# Patient Record
Sex: MALE | Race: WHITE | NOT HISPANIC OR LATINO | Employment: OTHER | URBAN - METROPOLITAN AREA
[De-identification: names, ages, dates, MRNs, and addresses within clinical notes are randomized per-mention and may not be internally consistent; named-entity substitution may affect disease eponyms.]

---

## 2019-07-15 PROBLEM — G43.919 INTRACTABLE MIGRAINE: Status: ACTIVE | Noted: 2017-09-22

## 2019-07-16 ENCOUNTER — OFFICE VISIT (OUTPATIENT)
Dept: INTERNAL MEDICINE | Facility: CLINIC | Age: 44
End: 2019-07-16
Payer: COMMERCIAL

## 2019-07-16 VITALS
DIASTOLIC BLOOD PRESSURE: 76 MMHG | TEMPERATURE: 98 F | WEIGHT: 240 LBS | SYSTOLIC BLOOD PRESSURE: 114 MMHG | RESPIRATION RATE: 14 BRPM | HEIGHT: 70 IN | HEART RATE: 78 BPM | OXYGEN SATURATION: 98 % | BODY MASS INDEX: 34.36 KG/M2

## 2019-07-16 DIAGNOSIS — Z00.00 ENCOUNTER FOR GENERAL ADULT MEDICAL EXAMINATION WITHOUT ABNORMAL FINDINGS: Primary | ICD-10-CM

## 2019-07-16 PROBLEM — G43.919 INTRACTABLE MIGRAINE: Status: RESOLVED | Noted: 2017-09-22 | Resolved: 2019-07-16

## 2019-07-16 PROCEDURE — 99386 PREV VISIT NEW AGE 40-64: CPT | Performed by: NURSE PRACTITIONER

## 2019-07-16 ASSESSMENT — ENCOUNTER SYMPTOMS
SORE THROAT: 0
ACTIVITY CHANGE: 0
NAUSEA: 0
ABDOMINAL PAIN: 0
PALPITATIONS: 0
LIGHT-HEADEDNESS: 0
FREQUENCY: 0
VOMITING: 0
HEADACHES: 0
COLOR CHANGE: 0
FEVER: 0
ADENOPATHY: 0
COUGH: 0
SHORTNESS OF BREATH: 0

## 2019-07-16 NOTE — ASSESSMENT & PLAN NOTE
Anticipatory guidance was given and reviewed.  Immunizations were addressed and reviewed today as well.  Advised to continue with healthy diet and increased exercise.  Next PE due in one year.

## 2019-07-16 NOTE — PROGRESS NOTES
Daily Progress Note      Subjective      Patient ID: Yoel Mendiola is a 43 y.o. male presents   Chief Complaint   Patient presents with   • Annual Exam   Last seen in 2014. Family known to me.     HPI    The following have been reviewed and updated as appropriate in this visit:  Tobacco  Allergies  Meds  Problems  Med Hx  Surg Hx  Fam Hx  Soc Hx        Review of Systems   Constitutional: Negative for activity change and fever.   HENT: Negative for congestion and sore throat.    Eyes: Negative for visual disturbance.   Respiratory: Negative for cough and shortness of breath.    Cardiovascular: Negative for chest pain and palpitations.   Gastrointestinal: Negative for abdominal pain, nausea and vomiting.   Endocrine: Negative for cold intolerance and heat intolerance.   Genitourinary: Negative for frequency.   Skin: Negative for color change and rash.   Neurological: Negative for light-headedness and headaches.   Hematological: Negative for adenopathy.           History reviewed. No pertinent past medical history.  Past Surgical History:   Procedure Laterality Date   • CYST REMOVAL     • WISDOM TOOTH EXTRACTION       Social History     Social History   • Marital status:      Spouse name: N/A   • Number of children: 2   • Years of education: N/A     Occupational History   • Not on file.     Social History Main Topics   • Smoking status: Former Smoker   • Smokeless tobacco: Never Used      Comment: quit 10 years ago   • Alcohol use No   • Drug use: Unknown   • Sexual activity: Not on file     Other Topics Concern   • Not on file     Social History Narrative    Self - employed -       Allergies   Allergen Reactions   • Penicillins Rash     No current outpatient prescriptions on file.     No current facility-administered medications for this visit.          Objective     /76 (BP Location: Left upper arm, Patient Position: Sitting)   Pulse 78   Temp 36.7 °C (98 °F) (Oral)   Resp 14   " Ht 1.778 m (5' 10\")   Wt 109 kg (240 lb)   SpO2 98%   BMI 34.44 kg/m²       Physical Exam   Constitutional: He is oriented to person, place, and time. He appears well-developed and well-nourished. He is cooperative. No distress.   HENT:   Head: Normocephalic.   Right Ear: Tympanic membrane is not erythematous.   Left Ear: Tympanic membrane is not erythematous.   Mouth/Throat: Mucous membranes are normal. No oropharyngeal exudate.   Eyes: Pupils are equal, round, and reactive to light. Conjunctivae are normal.   Neck: Normal range of motion. No thyromegaly present.   Cardiovascular: Normal rate, regular rhythm, normal heart sounds and intact distal pulses.    No murmur heard.  Pulmonary/Chest: Effort normal and breath sounds normal. No accessory muscle usage. No respiratory distress. He has no rales. He exhibits no tenderness.   Abdominal: Soft. Bowel sounds are normal. There is no hepatosplenomegaly. There is no tenderness.   Musculoskeletal: Normal range of motion.   Lymphadenopathy:     He has no cervical adenopathy.   Neurological: He is alert and oriented to person, place, and time. He displays normal reflexes. No cranial nerve deficit.   Skin: Skin is warm and dry. Capillary refill takes less than 2 seconds.   Psychiatric: He has a normal mood and affect.   Nursing note and vitals reviewed.      Assessment/Plan   Problem List Items Addressed This Visit        Other    Encounter for general adult medical examination without abnormal findings - Primary     Anticipatory guidance was given and reviewed.  Immunizations were addressed and reviewed today as well.  Advised to continue with healthy diet and increased exercise.  Next PE due in one year.         Relevant Orders    Comprehensive metabolic panel    CBC    Lipid panel    TSH 3rd Generation    Urinalysis with Reflex Culture              RUDOLPH Somers  7/16/2019  "

## 2019-07-26 LAB
ALBUMIN SERPL-MCNC: 4.1 G/DL (ref 3.6–5.1)
ALBUMIN/GLOB SERPL: 1.7 (CALC) (ref 1–2.5)
ALP SERPL-CCNC: 81 U/L (ref 40–115)
ALT SERPL-CCNC: 36 U/L (ref 9–46)
APPEARANCE UR: CLEAR
AST SERPL-CCNC: 28 U/L (ref 10–40)
BACTERIA #/AREA URNS HPF: NORMAL /HPF
BACTERIA UR CULT: NORMAL
BILIRUB SERPL-MCNC: 0.5 MG/DL (ref 0.2–1.2)
BILIRUB UR QL STRIP: NEGATIVE
BUN SERPL-MCNC: 16 MG/DL (ref 7–25)
BUN/CREAT SERPL: NORMAL (CALC) (ref 6–22)
CALCIUM SERPL-MCNC: 9 MG/DL (ref 8.6–10.3)
CHLORIDE SERPL-SCNC: 106 MMOL/L (ref 98–110)
CHOLEST SERPL-MCNC: 194 MG/DL
CHOLEST/HDLC SERPL: 4.5 (CALC)
CO2 SERPL-SCNC: 27 MMOL/L (ref 20–32)
COLOR UR: YELLOW
CREAT SERPL-MCNC: 0.98 MG/DL (ref 0.6–1.35)
ERYTHROCYTE [DISTWIDTH] IN BLOOD BY AUTOMATED COUNT: 12.9 % (ref 11–15)
GLOBULIN SER CALC-MCNC: 2.4 G/DL (CALC) (ref 1.9–3.7)
GLUCOSE SERPL-MCNC: 97 MG/DL (ref 65–99)
GLUCOSE UR QL STRIP: NEGATIVE
HCT VFR BLD AUTO: 42.4 % (ref 38.5–50)
HDLC SERPL-MCNC: 43 MG/DL
HGB BLD-MCNC: 14.2 G/DL (ref 13.2–17.1)
HGB UR QL STRIP: NEGATIVE
HYALINE CASTS #/AREA URNS LPF: NORMAL /LPF
KETONES UR QL STRIP: NEGATIVE
LDLC SERPL CALC-MCNC: 127 MG/DL (CALC)
LEUKOCYTE ESTERASE UR QL STRIP: NEGATIVE
MCH RBC QN AUTO: 30.3 PG (ref 27–33)
MCHC RBC AUTO-ENTMCNC: 33.5 G/DL (ref 32–36)
MCV RBC AUTO: 90.6 FL (ref 80–100)
NITRITE UR QL STRIP: NEGATIVE
NONHDLC SERPL-MCNC: 151 MG/DL (CALC)
PH UR STRIP: 5.5 [PH] (ref 5–8)
PLATELET # BLD AUTO: 252 THOUSAND/UL (ref 140–400)
PMV BLD REES-ECKER: 10.1 FL (ref 7.5–12.5)
POTASSIUM SERPL-SCNC: 4.4 MMOL/L (ref 3.5–5.3)
PROT SERPL-MCNC: 6.5 G/DL (ref 6.1–8.1)
PROT UR QL STRIP: NEGATIVE
QUEST EGFR NON-AFR. AMERICAN: 93 ML/MIN/1.73M2
RBC # BLD AUTO: 4.68 MILLION/UL (ref 4.2–5.8)
RBC #/AREA URNS HPF: NORMAL /HPF
SODIUM SERPL-SCNC: 137 MMOL/L (ref 135–146)
SP GR UR STRIP: 1.02 (ref 1–1.03)
SQUAMOUS #/AREA URNS HPF: NORMAL /HPF
TRIGL SERPL-MCNC: 127 MG/DL
TSH SERPL-ACNC: 1.35 MIU/L (ref 0.4–4.5)
WBC # BLD AUTO: 5.8 THOUSAND/UL (ref 3.8–10.8)
WBC #/AREA URNS HPF: NORMAL /HPF

## 2019-07-26 NOTE — PROGRESS NOTES
Please tell Yoel that his labs are stable! The one cholesterol level is minimally high, so recommend he keep the fats to less than 30 grams and keep up the good work with increased activity. Next labs due in one year.

## 2020-02-10 ENCOUNTER — TELEPHONE (OUTPATIENT)
Dept: INTERNAL MEDICINE | Facility: CLINIC | Age: 45
End: 2020-02-10

## 2020-02-10 ENCOUNTER — TRANSCRIBE ORDERS (OUTPATIENT)
Dept: SCHEDULING | Age: 45
End: 2020-02-10

## 2020-02-10 DIAGNOSIS — M25.512 PAIN IN LEFT SHOULDER: ICD-10-CM

## 2020-02-10 DIAGNOSIS — M54.10 RADICULOPATHY, SITE UNSPECIFIED: Primary | ICD-10-CM

## 2020-02-10 NOTE — TELEPHONE ENCOUNTER
Yoel is following [facility] and has already had an appmt with her there.

## 2020-02-14 ENCOUNTER — HOSPITAL ENCOUNTER (OUTPATIENT)
Dept: RADIOLOGY | Facility: HOSPITAL | Age: 45
Discharge: HOME | End: 2020-02-14
Attending: NURSE PRACTITIONER
Payer: COMMERCIAL

## 2020-02-14 DIAGNOSIS — M54.10 RADICULOPATHY, SITE UNSPECIFIED: ICD-10-CM

## 2020-02-14 DIAGNOSIS — M25.512 PAIN IN LEFT SHOULDER: ICD-10-CM

## 2020-02-14 PROCEDURE — 73030 X-RAY EXAM OF SHOULDER: CPT | Mod: LT

## 2020-03-13 ENCOUNTER — TRANSCRIBE ORDERS (OUTPATIENT)
Dept: SCHEDULING | Age: 45
End: 2020-03-13

## 2020-03-13 DIAGNOSIS — M75.52 BURSITIS OF LEFT SHOULDER: Primary | ICD-10-CM

## 2020-03-16 ENCOUNTER — HOSPITAL ENCOUNTER (OUTPATIENT)
Dept: RADIOLOGY | Facility: HOSPITAL | Age: 45
Discharge: HOME | End: 2020-03-16
Attending: ORTHOPAEDIC SURGERY
Payer: COMMERCIAL

## 2020-03-16 DIAGNOSIS — M75.52 BURSITIS OF LEFT SHOULDER: ICD-10-CM

## 2020-08-31 ENCOUNTER — OFFICE VISIT (OUTPATIENT)
Dept: FAMILY MEDICINE | Facility: CLINIC | Age: 45
End: 2020-08-31
Payer: COMMERCIAL

## 2020-08-31 VITALS
RESPIRATION RATE: 18 BRPM | SYSTOLIC BLOOD PRESSURE: 152 MMHG | WEIGHT: 252 LBS | DIASTOLIC BLOOD PRESSURE: 88 MMHG | OXYGEN SATURATION: 98 % | HEIGHT: 71 IN | HEART RATE: 62 BPM | TEMPERATURE: 98 F | BODY MASS INDEX: 35.28 KG/M2

## 2020-08-31 DIAGNOSIS — R03.0 ELEVATED BLOOD PRESSURE READING WITHOUT DIAGNOSIS OF HYPERTENSION: ICD-10-CM

## 2020-08-31 DIAGNOSIS — Z00.00 ANNUAL PHYSICAL EXAM: Primary | ICD-10-CM

## 2020-08-31 DIAGNOSIS — Z12.11 ENCOUNTER FOR SCREENING COLONOSCOPY: ICD-10-CM

## 2020-08-31 PROCEDURE — 99396 PREV VISIT EST AGE 40-64: CPT | Performed by: FAMILY MEDICINE

## 2020-09-05 ENCOUNTER — PREP FOR CASE (OUTPATIENT)
Dept: SURGERY | Facility: CLINIC | Age: 45
End: 2020-09-05

## 2020-09-05 DIAGNOSIS — Z12.11 ENCOUNTER FOR SCREENING FOR MALIGNANT NEOPLASM OF COLON: Primary | ICD-10-CM

## 2020-09-05 LAB
ALBUMIN SERPL-MCNC: 4.2 G/DL (ref 3.6–5.1)
ALBUMIN/GLOB SERPL: 1.8 (CALC) (ref 1–2.5)
ALP SERPL-CCNC: 79 U/L (ref 36–130)
ALT SERPL-CCNC: 65 U/L (ref 9–46)
APPEARANCE UR: CLEAR
AST SERPL-CCNC: 35 U/L (ref 10–40)
BACTERIA #/AREA URNS HPF: NORMAL /HPF
BACTERIA UR CULT: NORMAL
BASOPHILS # BLD AUTO: 40 CELLS/UL (ref 0–200)
BASOPHILS NFR BLD AUTO: 0.7 %
BILIRUB SERPL-MCNC: 0.5 MG/DL (ref 0.2–1.2)
BILIRUB UR QL STRIP: NEGATIVE
BUN SERPL-MCNC: 12 MG/DL (ref 7–25)
BUN/CREAT SERPL: ABNORMAL (CALC) (ref 6–22)
CALCIUM SERPL-MCNC: 9.2 MG/DL (ref 8.6–10.3)
CHLORIDE SERPL-SCNC: 106 MMOL/L (ref 98–110)
CHOLEST SERPL-MCNC: 193 MG/DL
CHOLEST/HDLC SERPL: 4.6 (CALC)
CO2 SERPL-SCNC: 26 MMOL/L (ref 20–32)
COLOR UR: YELLOW
CREAT SERPL-MCNC: 0.93 MG/DL (ref 0.6–1.35)
EOSINOPHIL # BLD AUTO: 234 CELLS/UL (ref 15–500)
EOSINOPHIL NFR BLD AUTO: 4.1 %
ERYTHROCYTE [DISTWIDTH] IN BLOOD BY AUTOMATED COUNT: 13.1 % (ref 11–15)
GLOBULIN SER CALC-MCNC: 2.4 G/DL (CALC) (ref 1.9–3.7)
GLUCOSE SERPL-MCNC: 98 MG/DL (ref 65–99)
GLUCOSE UR QL STRIP: NEGATIVE
HCT VFR BLD AUTO: 41.9 % (ref 38.5–50)
HDLC SERPL-MCNC: 42 MG/DL
HGB BLD-MCNC: 14 G/DL (ref 13.2–17.1)
HGB UR QL STRIP: NEGATIVE
HYALINE CASTS #/AREA URNS LPF: NORMAL /LPF
KETONES UR QL STRIP: NEGATIVE
LDLC SERPL CALC-MCNC: 129 MG/DL (CALC)
LEUKOCYTE ESTERASE UR QL STRIP: NEGATIVE
LYMPHOCYTES # BLD AUTO: 1767 CELLS/UL (ref 850–3900)
LYMPHOCYTES NFR BLD AUTO: 31 %
MCH RBC QN AUTO: 30.2 PG (ref 27–33)
MCHC RBC AUTO-ENTMCNC: 33.4 G/DL (ref 32–36)
MCV RBC AUTO: 90.5 FL (ref 80–100)
MONOCYTES # BLD AUTO: 502 CELLS/UL (ref 200–950)
MONOCYTES NFR BLD AUTO: 8.8 %
NEUTROPHILS # BLD AUTO: 3158 CELLS/UL (ref 1500–7800)
NEUTROPHILS NFR BLD AUTO: 55.4 %
NITRITE UR QL STRIP: NEGATIVE
NONHDLC SERPL-MCNC: 151 MG/DL (CALC)
PH UR STRIP: 6 [PH] (ref 5–8)
PLATELET # BLD AUTO: 221 THOUSAND/UL (ref 140–400)
PMV BLD REES-ECKER: 10.3 FL (ref 7.5–12.5)
POTASSIUM SERPL-SCNC: 4.5 MMOL/L (ref 3.5–5.3)
PROT SERPL-MCNC: 6.6 G/DL (ref 6.1–8.1)
PROT UR QL STRIP: NEGATIVE
QUEST EGFR NON-AFR. AMERICAN: 99 ML/MIN/1.73M2
RBC # BLD AUTO: 4.63 MILLION/UL (ref 4.2–5.8)
RBC #/AREA URNS HPF: NORMAL /HPF
SODIUM SERPL-SCNC: 139 MMOL/L (ref 135–146)
SP GR UR STRIP: 1.02 (ref 1–1.03)
SQUAMOUS #/AREA URNS HPF: NORMAL /HPF
TRIGL SERPL-MCNC: 114 MG/DL
TSH SERPL-ACNC: 1.11 MIU/L (ref 0.4–4.5)
WBC # BLD AUTO: 5.7 THOUSAND/UL (ref 3.8–10.8)
WBC #/AREA URNS HPF: NORMAL /HPF

## 2020-09-05 RX ORDER — SODIUM CHLORIDE 9 MG/ML
INJECTION, SOLUTION INTRAVENOUS CONTINUOUS
Status: CANCELLED | OUTPATIENT
Start: 2020-09-05 | End: 2020-09-06

## 2020-09-05 NOTE — H&P
Chief Complaint: No chief complaint on file.  .    HPI     Patient is a 44 y.o. male who presents with the following:    - need colonoscopy  - 43 y/o  - no risk factors  - no sx  - never had one     Medical History:   No past medical history on file.    Surgical History:   Past Surgical History:   Procedure Laterality Date   • CYST REMOVAL     • WISDOM TOOTH EXTRACTION         Social History:   Social History     Socioeconomic History   • Marital status:      Spouse name: Not on file   • Number of children: 2   • Years of education: Not on file   • Highest education level: Not on file   Occupational History   • Not on file   Social Needs   • Financial resource strain: Not on file   • Food insecurity:     Worry: Not on file     Inability: Not on file   • Transportation needs:     Medical: Not on file     Non-medical: Not on file   Tobacco Use   • Smoking status: Former Smoker   • Smokeless tobacco: Never Used   • Tobacco comment: quit 10 years ago   Substance and Sexual Activity   • Alcohol use: No   • Drug use: Not on file   • Sexual activity: Not on file   Lifestyle   • Physical activity:     Days per week: Not on file     Minutes per session: Not on file   • Stress: Not on file   Relationships   • Social connections:     Talks on phone: Not on file     Gets together: Not on file     Attends Religion service: Not on file     Active member of club or organization: Not on file     Attends meetings of clubs or organizations: Not on file     Relationship status: Not on file   • Intimate partner violence:     Fear of current or ex partner: Not on file     Emotionally abused: Not on file     Physically abused: Not on file     Forced sexual activity: Not on file   Other Topics Concern   • Not on file   Social History Narrative    Self - employed -         Family History:   Family History   Problem Relation Age of Onset   • Hypertension Biological Mother    • Heart disease Biological Father     • Diabetes Biological Father    • Hyperlipidemia Biological Father    • Hypertension Biological Father    • No Known Problems Biological Sister    • Diabetes Maternal Grandmother    • Stroke Maternal Grandmother    • Colon cancer Maternal Grandfather    • Heart attack Paternal Grandmother    • Colon cancer Other        Allergies:   Penicillins    Current Medications:    No current outpatient medications on file.    Review of Systems  All 14 systems reviewed and the findings are not pertinent to the current problem.    Objective     Vital Signs  There were no vitals filed for this visit.  There is no height or weight on file to calculate BMI.      Physicial Exam  To be done    Problem List Items Addressed This Visit     None        Plan screen low risk scope    Devyn Rivas MD 9/5/2020 9:58 AM

## 2020-09-10 PROBLEM — Z12.11 ENCOUNTER FOR SCREENING FOR MALIGNANT NEOPLASM OF COLON: Status: ACTIVE | Noted: 2020-09-10

## 2020-09-14 ENCOUNTER — OFFICE VISIT (OUTPATIENT)
Dept: FAMILY MEDICINE | Facility: CLINIC | Age: 45
End: 2020-09-14
Payer: COMMERCIAL

## 2020-09-14 VITALS
WEIGHT: 257 LBS | RESPIRATION RATE: 16 BRPM | OXYGEN SATURATION: 98 % | DIASTOLIC BLOOD PRESSURE: 92 MMHG | BODY MASS INDEX: 35.98 KG/M2 | HEIGHT: 71 IN | TEMPERATURE: 98.3 F | HEART RATE: 63 BPM | SYSTOLIC BLOOD PRESSURE: 136 MMHG

## 2020-09-14 DIAGNOSIS — E78.5 DYSLIPIDEMIA, GOAL LDL BELOW 100: ICD-10-CM

## 2020-09-14 DIAGNOSIS — Z23 INFLUENZA VACCINATION ADMINISTERED AT CURRENT VISIT: ICD-10-CM

## 2020-09-14 DIAGNOSIS — R03.0 ELEVATED BLOOD PRESSURE READING IN OFFICE WITHOUT DIAGNOSIS OF HYPERTENSION: Primary | ICD-10-CM

## 2020-09-14 DIAGNOSIS — R89.9 ABNORMAL LABORATORY TEST RESULT: ICD-10-CM

## 2020-09-14 PROCEDURE — 90471 IMMUNIZATION ADMIN: CPT | Performed by: FAMILY MEDICINE

## 2020-09-14 PROCEDURE — 99213 OFFICE O/P EST LOW 20 MIN: CPT | Mod: 25 | Performed by: FAMILY MEDICINE

## 2020-09-14 PROCEDURE — 90686 IIV4 VACC NO PRSV 0.5 ML IM: CPT | Performed by: FAMILY MEDICINE

## 2020-09-14 ASSESSMENT — ENCOUNTER SYMPTOMS
FEVER: 0
LIGHT-HEADEDNESS: 0
SHORTNESS OF BREATH: 0
CHEST TIGHTNESS: 0
HEADACHES: 0
DIZZINESS: 0
APPETITE CHANGE: 0

## 2020-09-14 ASSESSMENT — PAIN SCALES - GENERAL: PAINLEVEL: 0-NO PAIN

## 2020-09-14 NOTE — PATIENT INSTRUCTIONS
Your blood pressure was elevated in the office today but not high enough for medications at this point.     Continue with diet and exercise management.     Goal blood pressure is under 140/90. Ideally, blood pressure is 120/80.    If you experience any headaches, dizziness, or changes in vision, please contact the office for a recheck.

## 2020-09-14 NOTE — PROGRESS NOTES
"Subjective      Patient ID: Yoel Mendiola is a 44 y.o. male presents to blood pressure recheck  1975      Patient's previous blood pressure 2 weeks ago was 152/88.  Pressure today is 136/92.  Since last visit, patient has been exercising more frequently.      The following have been reviewed and updated as appropriate in this visit:  Allergies  Meds  Problems       Review of Systems   Constitutional: Negative for appetite change and fever.   Eyes: Negative for visual disturbance.   Respiratory: Negative for chest tightness and shortness of breath.    Cardiovascular: Negative for chest pain.   Neurological: Negative for dizziness, syncope, light-headedness and headaches.   Psychiatric/Behavioral: Negative for behavioral problems.       Objective     Vitals:    09/14/20 1141 09/14/20 1149   BP: (!) 126/96 (!) 136/92   BP Location: Right upper arm Right upper arm   Patient Position: Sitting Sitting   Pulse: 63    Resp: 16    Temp: 36.8 °C (98.3 °F)    TempSrc: Temporal    SpO2: 98%    Weight: 117 kg (257 lb)    Height: 1.803 m (5' 11\")      Body mass index is 35.84 kg/m².    Physical Exam   Constitutional: He is oriented to person, place, and time. He appears well-developed and well-nourished.   HENT:   Head: Normocephalic and atraumatic.   Eyes: EOM are normal.   Neck: Normal range of motion. Neck supple.   Cardiovascular: Normal rate, regular rhythm and normal heart sounds. Exam reveals no friction rub.   No murmur heard.  Pulmonary/Chest: Effort normal and breath sounds normal. No respiratory distress. He has no wheezes. He has no rales.   Neurological: He is alert and oriented to person, place, and time.   Nursing note and vitals reviewed.      Assessment/Plan   Diagnoses and all orders for this visit:    Elevated blood pressure reading in office without diagnosis of hypertension (Primary)  Comments:  Discussed treatment options including medication and diet/exercise management.  Patient has elected for " conservative management with diet and exercise at this time.  Advised to check blood pressure once or twice a week to maintain goal of less than 140/90.  Follow-up in 3 months.    Dyslipidemia, goal LDL below 100  Comments:  ASCVD 3%.  Based on recommendation, no statin management at this time.  Continue diet and exercise management along with fish oil or red yeast rice supplement.    Abnormal laboratory test result  Discussed test results with patient in the office today.    Influenza vaccination administered at current visit  FluLaval was given in the office today. There were no adverse reaction to the vaccine given today. Counseling was done in the room.    -     Influenza vaccine quadrivalent preservative free 6 mon and older IM

## 2020-10-12 ENCOUNTER — APPOINTMENT (OUTPATIENT)
Dept: LAB | Facility: HOSPITAL | Age: 45
End: 2020-10-12
Attending: SURGERY
Payer: COMMERCIAL

## 2020-10-12 DIAGNOSIS — Z12.11 ENCOUNTER FOR SCREENING FOR MALIGNANT NEOPLASM OF COLON: ICD-10-CM

## 2020-10-12 PROCEDURE — U0003 INFECTIOUS AGENT DETECTION BY NUCLEIC ACID (DNA OR RNA); SEVERE ACUTE RESPIRATORY SYNDROME CORONAVIRUS 2 (SARS-COV-2) (CORONAVIRUS DISEASE [COVID-19]), AMPLIFIED PROBE TECHNIQUE, MAKING USE OF HIGH THROUGHPUT TECHNOLOGIES AS DESCRIBED BY CMS-2020-01-R: HCPCS

## 2020-10-13 LAB — SARS-COV-2 RNA RESP QL NAA+PROBE: NOT DETECTED

## 2020-10-15 ENCOUNTER — ANESTHESIA EVENT (OUTPATIENT)
Dept: ENDOSCOPY | Facility: HOSPITAL | Age: 45
End: 2020-10-15
Payer: COMMERCIAL

## 2020-10-15 ENCOUNTER — ANESTHESIA (OUTPATIENT)
Dept: ENDOSCOPY | Facility: HOSPITAL | Age: 45
End: 2020-10-15
Payer: COMMERCIAL

## 2020-10-15 ENCOUNTER — HOSPITAL ENCOUNTER (OUTPATIENT)
Facility: HOSPITAL | Age: 45
Discharge: HOME | End: 2020-10-15
Attending: SURGERY | Admitting: SURGERY
Payer: COMMERCIAL

## 2020-10-15 VITALS
OXYGEN SATURATION: 97 % | HEIGHT: 71 IN | HEART RATE: 54 BPM | DIASTOLIC BLOOD PRESSURE: 85 MMHG | WEIGHT: 250 LBS | BODY MASS INDEX: 35 KG/M2 | TEMPERATURE: 98.5 F | RESPIRATION RATE: 16 BRPM | SYSTOLIC BLOOD PRESSURE: 124 MMHG

## 2020-10-15 DIAGNOSIS — Z12.11 ENCOUNTER FOR SCREENING FOR MALIGNANT NEOPLASM OF COLON: Primary | ICD-10-CM

## 2020-10-15 PROCEDURE — 200200 PR NO CHARGE: Performed by: SURGERY

## 2020-10-15 PROCEDURE — 0DJD8ZZ INSPECTION OF LOWER INTESTINAL TRACT, VIA NATURAL OR ARTIFICIAL OPENING ENDOSCOPIC: ICD-10-PCS | Performed by: SURGERY

## 2020-10-15 PROCEDURE — 25800000 HC PHARMACY IV SOLUTIONS: Performed by: ANESTHESIOLOGY

## 2020-10-15 PROCEDURE — 63600000 HC DRUGS/DETAIL CODE: Mod: JW | Performed by: ANESTHESIOLOGY

## 2020-10-15 PROCEDURE — 37000001 HC ANESTHESIA GENERAL: Performed by: SURGERY

## 2020-10-15 PROCEDURE — 25000000 HC PHARMACY GENERAL: Performed by: ANESTHESIOLOGY

## 2020-10-15 PROCEDURE — G0121 COLON CA SCRN NOT HI RSK IND: HCPCS | Performed by: SURGERY

## 2020-10-15 PROCEDURE — 75000019 HC COLONSCOPY SCREEN NORISK PT: Performed by: SURGERY

## 2020-10-15 RX ORDER — PROPOFOL 200MG/20ML
SYRINGE (ML) INTRAVENOUS AS NEEDED
Status: DISCONTINUED | OUTPATIENT
Start: 2020-10-15 | End: 2020-10-15 | Stop reason: SURG

## 2020-10-15 RX ORDER — SODIUM CHLORIDE 9 MG/ML
INJECTION, SOLUTION INTRAVENOUS CONTINUOUS PRN
Status: DISCONTINUED | OUTPATIENT
Start: 2020-10-15 | End: 2020-10-15 | Stop reason: SURG

## 2020-10-15 RX ORDER — SODIUM CHLORIDE 9 MG/ML
INJECTION, SOLUTION INTRAVENOUS CONTINUOUS
Status: CANCELLED | OUTPATIENT
Start: 2020-10-15 | End: 2020-10-16

## 2020-10-15 RX ORDER — LIDOCAINE HYDROCHLORIDE 10 MG/ML
INJECTION, SOLUTION INFILTRATION; PERINEURAL AS NEEDED
Status: DISCONTINUED | OUTPATIENT
Start: 2020-10-15 | End: 2020-10-15 | Stop reason: SURG

## 2020-10-15 RX ADMIN — LIDOCAINE HYDROCHLORIDE 5 ML: 10 INJECTION, SOLUTION INFILTRATION; PERINEURAL at 09:53

## 2020-10-15 RX ADMIN — SODIUM CHLORIDE: 900 INJECTION, SOLUTION INTRAVENOUS at 09:47

## 2020-10-15 RX ADMIN — PROPOFOL 20 MG: 10 INJECTION, EMULSION INTRAVENOUS at 09:58

## 2020-10-15 RX ADMIN — PROPOFOL 30 MG: 10 INJECTION, EMULSION INTRAVENOUS at 09:57

## 2020-10-15 RX ADMIN — PROPOFOL 200 MG: 10 INJECTION, EMULSION INTRAVENOUS at 09:53

## 2020-10-15 RX ADMIN — PROPOFOL 20 MG: 10 INJECTION, EMULSION INTRAVENOUS at 10:06

## 2020-10-15 RX ADMIN — PROPOFOL 30 MG: 10 INJECTION, EMULSION INTRAVENOUS at 09:59

## 2020-10-15 RX ADMIN — PROPOFOL 50 MG: 10 INJECTION, EMULSION INTRAVENOUS at 10:04

## 2020-10-15 RX ADMIN — PROPOFOL 30 MG: 10 INJECTION, EMULSION INTRAVENOUS at 10:03

## 2020-10-15 RX ADMIN — PROPOFOL 20 MG: 10 INJECTION, EMULSION INTRAVENOUS at 10:01

## 2020-10-15 RX ADMIN — PROPOFOL 30 MG: 10 INJECTION, EMULSION INTRAVENOUS at 10:08

## 2020-10-15 NOTE — ANESTHESIA PREPROCEDURE EVALUATION
Relevant Problems   No relevant active problems       Anesthesia ROS/MED HX      GI/Hepatic   Bowel prep  Endo/Other  Body Habitus: Normal       Past Surgical History:   Procedure Laterality Date   • CYST REMOVAL     • WISDOM TOOTH EXTRACTION         Physical Exam    Airway   Mallampati: II   TM distance: >3 FB   Neck ROM: full  Cardiovascular - normal   Rhythm: regular   Rate: normalPulmonary - normal   clear to auscultation  Dental - normal        Anesthesia Plan    Plan: general    Technique: total IV anesthesia     Lines and Monitors: PIV     Airway: natural airway / supplemental oxygen   ASA 1  Blood Products:   Use of Blood Products Discussed: No   Anesthetic plan and risks discussed with: patient  Induction:    intravenous   Postop Plan:   Patient Disposition: phase II then home

## 2020-10-15 NOTE — ANESTHESIA POSTPROCEDURE EVALUATION
Patient: Yoel Mendiola    Procedure Summary     Date: 10/15/20 Room / Location: [facility] 2 / [facility]    Anesthesia Start: 0948 Anesthesia Stop: 1017    Procedure: FLEXIBLE COLONOSCOPY FOR COLORECTAL CANCER SCREENING NO RISK (N/A Anus) Diagnosis:       Encounter for screening for malignant neoplasm of colon      (Encounter for screening for malignant neoplasm of colon [Z12.11])    Provider: Devyn Rivas MD Responsible Provider: Abi Santillan MD    Anesthesia Type: general ASA Status: 1          Anesthesia Type: general  PACU Vitals     No data found in the last 10 encounters.            Anesthesia Post Evaluation    Pain management: adequate  Patient location during evaluation: PACU  Patient participation: complete - patient participated  Level of consciousness: awake and alert  Cardiovascular status: acceptable  Airway Patency: adequate  Respiratory status: acceptable  Hydration status: acceptable  Anesthetic complications: no

## 2020-10-15 NOTE — OP NOTE
_______________________________________________________________________________  Patient Name: Yoel Mendiola             Procedure Date: 10/15/2020 9:13 AM  MRN: [id number]                     Account Number: [id number]  YOB: 1975             Age: 45  Gender: Male                          Note Status: Finalized  Attending MD: DAINA HIGUERA MD~KALEN  _______________________________________________________________________________  Procedure:            Colonoscopy  Indications:          Screening for colorectal malignant neoplasm  Providers:            DAINA HIGUERA MD~KALEN (Doctor), Jose Mojica RN  (Nurse), Robyn Cuenca (Nurse)  Referring MD:         SANDY DOAN MD  Requesting Provider:  Medicines:            Monitored Anesthesia Care  Complications:        No immediate complications.  _______________________________________________________________________________  Procedure:            After I obtained informed consent, the scope was passed  under direct vision. Throughout the procedure, the  patient's blood pressure, pulse, and oxygen saturations  were monitored continuously. The colonoscope was  introduced through the anus and advanced to the cecum,  identified by appendiceal orifice and ileocecal valve.  The colonoscopy was performed without difficulty. The  patient tolerated the procedure well. The quality of  the bowel preparation was good.  Estimated Blood Loss: Estimated blood loss: none.  Findings:  The perianal and digital rectal examinations were normal.  The colon (entire examined portion) appeared normal.  Impression:           - The entire examined colon is normal.  - No specimens collected.  Recommendation:       - Repeat colonoscopy in 10 years for screening purposes.  Procedure Code(s):    --- Professional ---  , Colorectal cancer screening; colonoscopy on  individual not meeting criteria for high risk  Diagnosis Code(s):    --- Professional ---  Z12.11, Encounter  for screening for malignant neoplasm  of colon  CPT copyright 2018 [employer]. All rights reserved.  The codes documented in this report are preliminary and upon  review may  be revised to meet current compliance requirements.  ____________________________  HEMANT GEORGE  10/15/2020 10:26:25 AM  This report has been signed electronically.  Number of Addenda: 0  Note Initiated On: 10/15/2020 9:13 AM

## 2020-10-15 NOTE — H&P
Chief Complaint: No chief complaint on file.  .    HPI     Patient is a 45 y.o. male who presents with the following:    - need colonoscopy  - 43 y/o  - no risk factors  - no sx  - never had one     Medical History:   History reviewed. No pertinent past medical history.    Surgical History:   Past Surgical History:   Procedure Laterality Date   • CYST REMOVAL     • WISDOM TOOTH EXTRACTION         Social History:   Social History     Socioeconomic History   • Marital status:      Spouse name: Not on file   • Number of children: 2   • Years of education: Not on file   • Highest education level: Not on file   Occupational History   • Not on file   Social Needs   • Financial resource strain: Not on file   • Food insecurity     Worry: Not on file     Inability: Not on file   • Transportation needs     Medical: Not on file     Non-medical: Not on file   Tobacco Use   • Smoking status: Former Smoker   • Smokeless tobacco: Never Used   • Tobacco comment: quit 10 years ago   Substance and Sexual Activity   • Alcohol use: No   • Drug use: Never   • Sexual activity: Not on file   Lifestyle   • Physical activity     Days per week: Not on file     Minutes per session: Not on file   • Stress: Not on file   Relationships   • Social connections     Talks on phone: Not on file     Gets together: Not on file     Attends [employer] service: Not on file     Active member of club or organization: Not on file     Attends meetings of clubs or organizations: Not on file     Relationship status: Not on file   • Intimate partner violence     Fear of current or ex partner: Not on file     Emotionally abused: Not on file     Physically abused: Not on file     Forced sexual activity: Not on file   Other Topics Concern   • Not on file   Social History Narrative    Self - employed -         Family History:   Family History   Problem Relation Age of Onset   • Hypertension Biological Mother    • Heart disease Biological  Father    • Diabetes Biological Father    • Hyperlipidemia Biological Father    • Hypertension Biological Father    • No Known Problems Biological Sister    • Diabetes Maternal Grandmother    • Stroke Maternal Grandmother    • Colon cancer Maternal Grandfather    • Heart attack Paternal Grandmother    • Colon cancer Other        Allergies:   Penicillins    Current Medications:    No current facility-administered medications for this encounter.     Review of Systems  All 14 systems reviewed and the findings are not pertinent to the current problem.    Objective     Vital Signs  There were no vitals filed for this visit.  There is no height or weight on file to calculate BMI.      Physicial Exam  To be done    Problem List Items Addressed This Visit     None        Plan screen low risk scope    Devyn Rivas MD 10/15/2020 8:36 AM

## 2020-10-15 NOTE — INTERVAL H&P NOTE
H&P reviewed. The patient was examined and there are no changes to the H&P.     - Lungs clear  - Heart RRR  - Abdomen soft and NT

## 2020-10-15 NOTE — ANESTHESIOLOGIST PRE-PROCEDURE ATTESTATION
Pre-Procedure Patient Identification:  I am the Primary Anesthesiologist and have identified the patient on 10/15/20 at 9:48 AM.   I have confirmed the following procedure(s) FLEXIBLE COLONOSCOPY FOR COLORECTAL CANCER SCREENING NO RISK will be performed by the following surgeon/proceduralist Devyn Rivas MD.

## 2020-12-14 ENCOUNTER — OFFICE VISIT (OUTPATIENT)
Dept: FAMILY MEDICINE | Facility: CLINIC | Age: 45
End: 2020-12-14
Payer: COMMERCIAL

## 2020-12-14 VITALS
SYSTOLIC BLOOD PRESSURE: 140 MMHG | OXYGEN SATURATION: 98 % | BODY MASS INDEX: 36.54 KG/M2 | DIASTOLIC BLOOD PRESSURE: 98 MMHG | RESPIRATION RATE: 16 BRPM | HEART RATE: 62 BPM | HEIGHT: 71 IN | WEIGHT: 261 LBS | TEMPERATURE: 98.2 F

## 2020-12-14 DIAGNOSIS — R03.0 ELEVATED BLOOD PRESSURE READING IN OFFICE WITH WHITE COAT SYNDROME, WITHOUT DIAGNOSIS OF HYPERTENSION: Primary | ICD-10-CM

## 2020-12-14 PROCEDURE — 99213 OFFICE O/P EST LOW 20 MIN: CPT | Performed by: FAMILY MEDICINE

## 2020-12-14 ASSESSMENT — ENCOUNTER SYMPTOMS
VOMITING: 0
NAUSEA: 0
DIZZINESS: 0
CHEST TIGHTNESS: 0
SHORTNESS OF BREATH: 0
SORE THROAT: 0
MYALGIAS: 1
HEADACHES: 0
FEVER: 0
LIGHT-HEADEDNESS: 0
DIARRHEA: 0

## 2020-12-14 ASSESSMENT — PAIN SCALES - GENERAL: PAINLEVEL: 0-NO PAIN

## 2020-12-14 ASSESSMENT — PATIENT HEALTH QUESTIONNAIRE - PHQ9: SUM OF ALL RESPONSES TO PHQ9 QUESTIONS 1 & 2: 0

## 2020-12-14 NOTE — PATIENT INSTRUCTIONS
Patient Education     Preventing Hypertension  Hypertension, commonly called high blood pressure, is when the force of blood pumping through the arteries is too strong. Arteries are blood vessels that carry blood from the heart throughout the body. Over time, hypertension can damage the arteries and decrease blood flow to important parts of the body, including the brain, heart, and kidneys. Often, hypertension does not cause symptoms until blood pressure is very high. For this reason, it is important to have your blood pressure checked on a regular basis.  Hypertension can often be prevented with diet and lifestyle changes. If you already have hypertension, you can control it with diet and lifestyle changes, as well as medicine.  What nutrition changes can be made?  Maintain a healthy diet. This includes:  · Eating less salt (sodium). Ask your health care provider how much sodium is safe for you to have. The general recommendation is to consume less than 1 tsp (2,300 mg) of sodium a day.  ? Do not add salt to your food.  ? Choose low-sodium options when grocery shopping and eating out.  · Limiting fats in your diet. You can do this by eating low-fat or fat-free dairy products and by eating less red meat.  · Eating more fruits, vegetables, and whole grains. Make a goal to eat:  ? 1½-2 cups of fresh fruits and vegetables each day.  ? 3-4 servings of whole grains each day.  · Avoiding foods and beverages that have added sugars.  · Eating fish that contain healthy fats (omega-3 fatty acids), such as mackerel or salmon.  If you need help putting together a healthy eating plan, try the DASH diet. This diet is high in fruits, vegetables, and whole grains. It is low in sodium, red meat, and added sugars. DASH stands for Dietary Approaches to Stop Hypertension.  What lifestyle changes can be made?    · Lose weight if you are overweight. Losing just 3?5% of your body weight can help prevent or control hypertension.  ? For  example, if your present weight is 200 lb (91 kg), a loss of 3-5% of your weight means losing 6-10 lb (2.7-4.5 kg).  ? Ask your health care provider to help you with a diet and exercise plan to safely lose weight.  · Get enough exercise. Do at least 150 minutes of moderate-intensity exercise each week.  ? You could do this in short exercise sessions several times a day, or you could do longer exercise sessions a few times a week. For example, you could take a brisk 10-minute walk or bike ride, 3 times a day, for 5 days a week.  · Find ways to reduce stress, such as exercising, meditating, listening to music, or taking a yoga class. If you need help reducing stress, ask your health care provider.  · Do not smoke. This includes e-cigarettes. Chemicals in tobacco and nicotine products raise your blood pressure each time you smoke. If you need help quitting, ask your health care provider.  · Avoid alcohol. If you drink alcohol, limit alcohol intake to no more than 1 drink a day for nonpregnant women and 2 drinks a day for men. One drink equals 12 oz of beer, 5 oz of wine, or 1½ oz of hard liquor.  Why are these changes important?  Diet and lifestyle changes can help you prevent hypertension, and they may make you feel better overall and improve your quality of life. If you have hypertension, making these changes will help you control it and help prevent major complications, such as:  · Hardening and narrowing of arteries that supply blood to:  ? Your heart. This can cause a heart attack.  ? Your brain. This can cause a stroke.  ? Your kidneys. This can cause kidney failure.  · Stress on your heart muscle, which can cause heart failure.  What can I do to lower my risk?  · Work with your health care provider to make a hypertension prevention plan that works for you. Follow your plan and keep all follow-up visits as told by your health care provider.  · Learn how to check your blood pressure at home. Make sure that you  know your personal target blood pressure, as told by your health care provider.  How is this treated?  In addition to diet and lifestyle changes, your health care provider may recommend medicines to help lower your blood pressure. You may need to try a few different medicines to find what works best for you. You also may need to take more than one medicine. Take over-the-counter and prescription medicines only as told by your health care provider.  Where to find support  Your health care provider can help you prevent hypertension and help you keep your blood pressure at a healthy level. Your local hospital or your community may also provide support services and prevention programs.  The American Heart Association offers an online support network at: http://supportnetwork.heart.org/high-blood-pressure  Where to find more information  Learn more about hypertension from:  · National Heart, Lung, and Blood [place]: www.nhlbi.nih.gov/health/health-topics/topics/hbp  · Centers for Disease Control and Prevention: www.cdc.gov/bloodpressure  · American Academy of Family Physicians: http://familydoctor.org/familydoctor/en/diseases-conditions/high-blood-pressure.printerview.all.html  Learn more about the DASH diet from:  · National Heart, Lung, and Blood [place]: www.nhlbi.nih.gov/health/health-topics/topics/dash  Contact a health care provider if:  · You think you are having a reaction to medicines you have taken.  · You have recurrent headaches or feel dizzy.  · You have swelling in your ankles.  · You have trouble with your vision.  Summary  · Hypertension often does not cause any symptoms until blood pressure is very high. It is important to get your blood pressure checked regularly.  · Diet and lifestyle changes are the most important steps in preventing hypertension.  · By keeping your blood pressure in a healthy range, you can prevent complications like heart attack, heart failure, stroke, and kidney  failure.  · Work with your health care provider to make a hypertension prevention plan that works for you.  This information is not intended to replace advice given to you by your health care provider. Make sure you discuss any questions you have with your health care provider.  Document Released: 01/01/2017 Document Revised: 04/10/2020 Document Reviewed: 08/28/2017  Elsevier Patient Education © 2020 [employer].

## 2020-12-14 NOTE — PROGRESS NOTES
"      Subjective      Patient ID: Yoel Mendiola is a 45 y.o. male.  1975      Patient presents for follow-up of elevated blood pressure.  Blood pressure has been stable at home with systolic in the 120s and diastolic in the 80s.  Asymptomatic.      The following have been reviewed and updated as appropriate in this visit:  Allergies  Meds  Problems       Review of Systems   Constitutional: Negative for fever.   HENT: Negative for sore throat.    Eyes: Negative for visual disturbance.   Respiratory: Negative for chest tightness and shortness of breath.    Cardiovascular: Negative for chest pain.   Gastrointestinal: Negative for diarrhea, nausea and vomiting.   Musculoskeletal: Positive for myalgias.   Neurological: Negative for dizziness, light-headedness and headaches.   Psychiatric/Behavioral: Negative for behavioral problems.       Objective     Vitals:    12/14/20 1146   BP: (!) 140/98   BP Location: Left upper arm   Patient Position: Sitting   Pulse: 62   Resp: 16   Temp: 36.8 °C (98.2 °F)   TempSrc: Temporal   SpO2: 98%   Weight: 118 kg (261 lb)   Height: 1.803 m (5' 11\")     Body mass index is 36.4 kg/m².    Physical Exam  Vitals signs and nursing note reviewed.   Constitutional:       Appearance: Normal appearance. He is well-developed.   HENT:      Head: Normocephalic and atraumatic.   Neck:      Musculoskeletal: Neck supple.   Cardiovascular:      Rate and Rhythm: Normal rate and regular rhythm.      Heart sounds: Normal heart sounds. No murmur. No friction rub.   Pulmonary:      Effort: Pulmonary effort is normal. No respiratory distress.      Breath sounds: Normal breath sounds. No wheezing or rales.   Musculoskeletal: Normal range of motion.   Neurological:      Mental Status: He is alert and oriented to person, place, and time.   Psychiatric:         Behavior: Behavior normal.         Assessment/Plan   Diagnoses and all orders for this visit:    Elevated blood pressure reading in office with " white coat syndrome, without diagnosis of hypertension (Primary)  Comments:  BP stable at home. Continue to monitor at home. Education provided. Follow up in one year for annual physical

## 2023-09-26 ENCOUNTER — OFFICE VISIT (OUTPATIENT)
Dept: FAMILY MEDICINE CLINIC | Facility: CLINIC | Age: 48
End: 2023-09-26
Payer: COMMERCIAL

## 2023-09-26 VITALS
TEMPERATURE: 97.7 F | RESPIRATION RATE: 16 BRPM | BODY MASS INDEX: 40.71 KG/M2 | SYSTOLIC BLOOD PRESSURE: 140 MMHG | HEIGHT: 70 IN | DIASTOLIC BLOOD PRESSURE: 100 MMHG | WEIGHT: 284.4 LBS | HEART RATE: 71 BPM | OXYGEN SATURATION: 97 %

## 2023-09-26 DIAGNOSIS — Z80.3 FAMILY HISTORY OF BREAST CANCER IN MALE: ICD-10-CM

## 2023-09-26 DIAGNOSIS — Z82.49 FAMILY HISTORY OF PREMATURE CAD: ICD-10-CM

## 2023-09-26 DIAGNOSIS — Z80.0 FAMILY HISTORY OF COLON CANCER: ICD-10-CM

## 2023-09-26 DIAGNOSIS — Z80.8 FAMILY HISTORY OF THYROID CANCER: ICD-10-CM

## 2023-09-26 DIAGNOSIS — Z13.1 SCREENING FOR DIABETES MELLITUS: ICD-10-CM

## 2023-09-26 DIAGNOSIS — Z00.00 ANNUAL PHYSICAL EXAM: Primary | ICD-10-CM

## 2023-09-26 DIAGNOSIS — Z13.6 SCREENING FOR CARDIOVASCULAR CONDITION: ICD-10-CM

## 2023-09-26 DIAGNOSIS — Z11.59 NEED FOR HEPATITIS C SCREENING TEST: ICD-10-CM

## 2023-09-26 DIAGNOSIS — Z23 NEED FOR INFLUENZA VACCINATION: ICD-10-CM

## 2023-09-26 DIAGNOSIS — I10 PRIMARY HYPERTENSION: ICD-10-CM

## 2023-09-26 DIAGNOSIS — Z12.5 SCREENING FOR PROSTATE CANCER: ICD-10-CM

## 2023-09-26 DIAGNOSIS — Z11.4 SCREENING FOR HIV (HUMAN IMMUNODEFICIENCY VIRUS): ICD-10-CM

## 2023-09-26 PROCEDURE — 90471 IMMUNIZATION ADMIN: CPT

## 2023-09-26 PROCEDURE — 99386 PREV VISIT NEW AGE 40-64: CPT | Performed by: FAMILY MEDICINE

## 2023-09-26 PROCEDURE — 90686 IIV4 VACC NO PRSV 0.5 ML IM: CPT

## 2023-09-26 RX ORDER — LOSARTAN POTASSIUM 25 MG/1
25 TABLET ORAL DAILY
Qty: 30 TABLET | Refills: 3 | Status: SHIPPED | OUTPATIENT
Start: 2023-09-26 | End: 2023-10-26

## 2023-09-26 NOTE — PROGRESS NOTES
[address] [facility]    NAME: Asif Code  AGE: 49 y.o. SEX: male  : 1975     DATE: 2023     Assessment and Plan:     Problem List Items Addressed This Visit        Cardiovascular and Mediastinum    Primary hypertension    Relevant Medications    losartan (COZAAR) 25 mg tablet    Other Relevant Orders    Lipid panel    Comprehensive metabolic panel    CBC and differential    TSH, 3rd generation with Free T4 reflex    UA (URINE) with reflex to Scope    Stress test only, exercise    CT coronary calcium score       Other    Family history of premature CAD    Relevant Orders    Lipid panel    Comprehensive metabolic panel    CBC and differential    TSH, 3rd generation with Free T4 reflex    UA (URINE) with reflex to Scope    Stress test only, exercise    CT coronary calcium score    BMI 40.0-44.9, adult ([facility])     BMI Counseling: Body mass index is 41.04 kg/m². The BMI is above normal. Nutrition recommendations include reducing portion sizes, decreasing overall calorie intake and 3-5 servings of fruits/vegetables daily. Exercise recommendations include vigorous aerobic physical activity for 75 minutes/week. Relevant Orders    Lipid panel    Comprehensive metabolic panel    CBC and differential    TSH, 3rd generation with Free T4 reflex    UA (URINE) with reflex to Scope    Family history of breast cancer in male     Dad had breast cancer         Family history of thyroid cancer     Mom had thyroid cancer         Family history of colon cancer     Patient had colonoscopy in   Due in  for next scope.          Other Visit Diagnoses     Annual physical exam    -  Primary    Relevant Orders    Lipid panel    Comprehensive metabolic panel    CBC and differential    TSH, 3rd generation with Free T4 reflex    UA (URINE) with reflex to Scope    PSA (Reflex To Free) (Serial)    Hepatitis C antibody    HIV 1/2 AG/AB W REFLEX [facility] only    Need for influenza vaccination        Relevant Orders    influenza vaccine, quadrivalent, 0.5 mL, preservative-free, for adult and pediatric patients 6 mos+ (AFLURIA, FLUARIX, FLULAVAL, FLUZONE) (Completed)    Screening for diabetes mellitus        Relevant Orders    Comprehensive metabolic panel    Screening for cardiovascular condition        Relevant Orders    Lipid panel    Screening for HIV (human immunodeficiency virus)        Relevant Orders    HIV 1/2 AG/AB W REFLEX [facility] only    Need for hepatitis C screening test        Relevant Orders    Hepatitis C antibody    Screening for prostate cancer        Relevant Orders    PSA (Reflex To Free) (Serial)          Immunizations and preventive care screenings were discussed with patient today. Appropriate education was printed on patient's after visit summary. Discussed risks and benefits of prostate cancer screening. We discussed the controversial history of PSA screening for prostate cancer in the [facility] as well as the risk of over detection and over treatment of prostate cancer by way of PSA screening. The patient understands that PSA blood testing is an imperfect way to screen for prostate cancer and that elevated PSA levels in the blood may also be caused by infection, inflammation, prostatic trauma or manipulation, urological procedures, or by benign prostatic enlargement. The role of the digital rectal examination in prostate cancer screening was also discussed and I discussed with him that there is large interobserver variability in the findings of digital rectal examination. Counseling:  Alcohol/drug use: discussed moderation in alcohol intake, the recommendations for healthy alcohol use, and avoidance of illicit drug use. Dental Health: discussed importance of regular tooth brushing, flossing, and dental visits.   Injury prevention: discussed safety/seat belts, safety helmets, smoke detectors, carbon dioxide detectors, and smoking near bedding or upholstery. · Sexual health: discussed sexually transmitted diseases, partner selection, use of condoms, avoidance of unintended pregnancy, and contraceptive alternatives. Return in about 5 weeks (around 10/31/2023) for HTN follow up/lab review. Chief Complaint:     Chief Complaint   Patient presents with   • Establish Care     New patient Physical      History of Present Illness:     Adult Annual Physical   Patient here for a comprehensive physical exam. .    Diet and Physical Activity  · Diet/Nutrition: well balanced diet. · Exercise: moderate cardiovascular exercise. Depression Screening  PHQ-2/9 Depression Screening    Little interest or pleasure in doing things: 0 - not at all  Feeling down, depressed, or hopeless: 0 - not at all  PHQ-2 Score: 0  PHQ-2 Interpretation: Negative depression screen          Review of Systems:     Review of Systems   Constitutional: Negative for chills and fever. HENT: Negative for congestion, postnasal drip, rhinorrhea and sinus pain. Eyes: Negative for photophobia and visual disturbance. Respiratory: Negative for cough and shortness of breath. Cardiovascular: Negative for chest pain, palpitations and leg swelling. Gastrointestinal: Negative for abdominal pain, constipation, diarrhea, nausea and vomiting. Genitourinary: Negative for difficulty urinating and dysuria. Musculoskeletal: Negative for arthralgias and myalgias. Skin: Negative for rash. Neurological: Negative for dizziness and syncope. Past Medical History:     History reviewed. No pertinent past medical history. Past Surgical History:     History reviewed. No pertinent surgical history. Family History:     History reviewed. No pertinent family history.    Social History:     Social History     Socioeconomic History   • Marital status: /Civil Union     Spouse name: None   • Number of children: None   • Years of education: None   • Highest education level: None   Occupational History   • None   Tobacco Use   • Smoking status: Former     Types: Cigarettes     Quit date: 05/2013     Years since quitting: 10.4   • Smokeless tobacco: Never   Vaping Use   • Vaping Use: Never used   Substance and Sexual Activity   • Alcohol use: Yes     Alcohol/week: 2.0 standard drinks of alcohol     Types: 1 Cans of beer, 1 Standard drinks or equivalent per week   • Drug use: None   • Sexual activity: None   Other Topics Concern   • None   Social History Narrative   • None     Social Determinants of Health     Financial Resource Strain: Not on file   Food Insecurity: Not on file   Transportation Needs: Not on file   Physical Activity: Not on file   Stress: Not on file   Social Connections: Not on file   Intimate Partner Violence: Not At Risk (9/26/2023)    Humiliation, Afraid, Rape, and Kick questionnaire    • Fear of Current or Ex-Partner: No    • Emotionally Abused: No    • Physically Abused: No    • Sexually Abused: No   Housing Stability: Not on file      Current Medications:     Current Outpatient Medications   Medication Sig Dispense Refill   • losartan (COZAAR) 25 mg tablet Take 1 tablet (25 mg total) by mouth daily 30 tablet 3     No current facility-administered medications for this visit. Allergies: Allergies   Allergen Reactions   • Penicillins Diarrhea and Rash      Physical Exam:     /100   Pulse 71   Temp 97.7 °F (36.5 °C) (Tympanic)   Resp 16   Ht 5' 9.8" (1.773 m)   Wt 129 kg (284 lb 6.4 oz)   SpO2 97%   BMI 41.04 kg/m²     Physical Exam  Constitutional:       General: He is not in acute distress. Appearance: Normal appearance. He is not ill-appearing, toxic-appearing or diaphoretic. HENT:      Head: Normocephalic and atraumatic. Right Ear: Tympanic membrane and ear canal normal.      Left Ear: Tympanic membrane and ear canal normal.      Nose: Nose normal. No congestion.       Mouth/Throat:      Mouth: Mucous membranes are moist.      Pharynx: Oropharynx is clear. No oropharyngeal exudate. Eyes:      Extraocular Movements: Extraocular movements intact. Conjunctiva/sclera: Conjunctivae normal.      Pupils: Pupils are equal, round, and reactive to light. Cardiovascular:      Rate and Rhythm: Normal rate and regular rhythm. Pulses: Normal pulses. Heart sounds: No murmur heard. Pulmonary:      Effort: Pulmonary effort is normal.      Breath sounds: Normal breath sounds. No wheezing, rhonchi or rales. Abdominal:      General: Bowel sounds are normal. There is no distension. Palpations: Abdomen is soft. Tenderness: There is no abdominal tenderness. Musculoskeletal:         General: No swelling or tenderness. Normal range of motion. Cervical back: Normal range of motion and neck supple. Skin:     General: Skin is warm and dry. Capillary Refill: Capillary refill takes less than 2 seconds. Neurological:      General: No focal deficit present. Mental Status: He is alert and oriented to person, place, and time. Cranial Nerves: No cranial nerve deficit. Psychiatric:         Mood and Affect: Mood normal.         Behavior: Behavior normal.         Thought Content:  Thought content normal.          Yamila Lakhani DO  [address] [facility]

## 2023-09-29 LAB
ALBUMIN SERPL-MCNC: 4.5 G/DL (ref 4.1–5.1)
ALBUMIN/GLOB SERPL: 1.8 {RATIO} (ref 1.2–2.2)
ALP SERPL-CCNC: 106 IU/L (ref 44–121)
ALT SERPL-CCNC: 41 IU/L (ref 0–44)
APPEARANCE UR: CLEAR
AST SERPL-CCNC: 32 IU/L (ref 0–40)
BASOPHILS # BLD AUTO: 0.1 X10E3/UL (ref 0–0.2)
BASOPHILS NFR BLD AUTO: 1 %
BILIRUB SERPL-MCNC: 0.8 MG/DL (ref 0–1.2)
BILIRUB UR QL STRIP: NEGATIVE
BUN SERPL-MCNC: 11 MG/DL (ref 6–24)
BUN/CREAT SERPL: 12 (ref 9–20)
CALCIUM SERPL-MCNC: 8.9 MG/DL (ref 8.7–10.2)
CHLORIDE SERPL-SCNC: 102 MMOL/L (ref 96–106)
CHOLEST SERPL-MCNC: 183 MG/DL (ref 100–199)
CHOLEST/HDLC SERPL: 4.8 RATIO (ref 0–5)
CO2 SERPL-SCNC: 23 MMOL/L (ref 20–29)
COLOR UR: YELLOW
CREAT SERPL-MCNC: 0.95 MG/DL (ref 0.76–1.27)
EGFR: 99 ML/MIN/1.73
EOSINOPHIL # BLD AUTO: 0.3 X10E3/UL (ref 0–0.4)
EOSINOPHIL NFR BLD AUTO: 4 %
ERYTHROCYTE [DISTWIDTH] IN BLOOD BY AUTOMATED COUNT: 13 % (ref 11.6–15.4)
GLOBULIN SER-MCNC: 2.5 G/DL (ref 1.5–4.5)
GLUCOSE SERPL-MCNC: 105 MG/DL (ref 70–99)
GLUCOSE UR QL: NEGATIVE
HCT VFR BLD AUTO: 41.8 % (ref 37.5–51)
HCV AB S/CO SERPL IA: NON REACTIVE
HDLC SERPL-MCNC: 38 MG/DL
HGB BLD-MCNC: 14.1 G/DL (ref 13–17.7)
HGB UR QL STRIP: NEGATIVE
HIV 1+2 AB+HIV1 P24 AG SERPL QL IA: NON REACTIVE
IMM GRANULOCYTES # BLD: 0 X10E3/UL (ref 0–0.1)
IMM GRANULOCYTES NFR BLD: 0 %
KETONES UR QL STRIP: NEGATIVE
LDLC SERPL CALC-MCNC: 119 MG/DL (ref 0–99)
LEUKOCYTE ESTERASE UR QL STRIP: NEGATIVE
LYMPHOCYTES # BLD AUTO: 1.9 X10E3/UL (ref 0.7–3.1)
LYMPHOCYTES NFR BLD AUTO: 25 %
MCH RBC QN AUTO: 30.5 PG (ref 26.6–33)
MCHC RBC AUTO-ENTMCNC: 33.7 G/DL (ref 31.5–35.7)
MCV RBC AUTO: 91 FL (ref 79–97)
MICRO URNS: NORMAL
MONOCYTES # BLD AUTO: 0.8 X10E3/UL (ref 0.1–0.9)
MONOCYTES NFR BLD AUTO: 11 %
NEUTROPHILS # BLD AUTO: 4.5 X10E3/UL (ref 1.4–7)
NEUTROPHILS NFR BLD AUTO: 59 %
NITRITE UR QL STRIP: NEGATIVE
PH UR STRIP: 5.5 [PH] (ref 5–7.5)
PLATELET # BLD AUTO: 242 X10E3/UL (ref 150–450)
POTASSIUM SERPL-SCNC: 4.2 MMOL/L (ref 3.5–5.2)
PROT SERPL-MCNC: 7 G/DL (ref 6–8.5)
PROT UR QL STRIP: NEGATIVE
PSA SERPL-MCNC: 2.9 NG/ML (ref 0–4)
RBC # BLD AUTO: 4.62 X10E6/UL (ref 4.14–5.8)
SL AMB REFLEX CRITERIA: NORMAL
SL AMB VLDL CHOLESTEROL CALC: 26 MG/DL (ref 5–40)
SODIUM SERPL-SCNC: 137 MMOL/L (ref 134–144)
SP GR UR: 1.03 (ref 1–1.03)
TRIGL SERPL-MCNC: 147 MG/DL (ref 0–149)
TSH SERPL DL<=0.005 MIU/L-ACNC: 1.28 UIU/ML (ref 0.45–4.5)
UROBILINOGEN UR STRIP-ACNC: 0.2 MG/DL (ref 0.2–1)
WBC # BLD AUTO: 7.6 X10E3/UL (ref 3.4–10.8)

## 2023-10-03 ENCOUNTER — HOSPITAL ENCOUNTER (OUTPATIENT)
Dept: CT IMAGING | Facility: HOSPITAL | Age: 48
Discharge: HOME | End: 2023-10-03
Payer: COMMERCIAL

## 2023-10-03 DIAGNOSIS — I10 PRIMARY HYPERTENSION: ICD-10-CM

## 2023-10-03 DIAGNOSIS — Z82.49 FAMILY HISTORY OF PREMATURE CAD: ICD-10-CM

## 2023-10-03 PROCEDURE — G1004 CDSM NDSC: HCPCS

## 2023-10-03 PROCEDURE — 75571 CT HRT W/O DYE W/CA TEST: CPT

## 2023-10-17 ENCOUNTER — HOSPITAL ENCOUNTER (OUTPATIENT)
Dept: NON INVASIVE DIAGNOSTICS | Age: 48
Discharge: HOME | End: 2023-10-17
Payer: COMMERCIAL

## 2023-10-17 DIAGNOSIS — Z82.49 FAMILY HISTORY OF PREMATURE CAD: ICD-10-CM

## 2023-10-17 DIAGNOSIS — I10 PRIMARY HYPERTENSION: ICD-10-CM

## 2023-10-17 LAB
CHEST PAIN STATEMENT: NORMAL
MAX DIASTOLIC BP: 90 MMHG
MAX HEART RATE: 155 BPM
MAX HR PERCENT: 90 %
MAX HR: 155 BPM
MAX PREDICTED HEART RATE: 171 BPM
MAX. SYSTOLIC BP: 220 MMHG
PROTOCOL NAME: NORMAL
RATE PRESSURE PRODUCT: NORMAL
REASON FOR TERMINATION: NORMAL
SL CV STRESS STAGE REACHED: 4
STRESS ANGINA INDEX: 0
STRESS BASELINE HR: 79 BPM
STRESS PEAK HR: 155 BPM
STRESS POST ESTIMATED WORKLOAD: 13.4 METS
STRESS POST EXERCISE DUR MIN: 10 MIN
STRESS POST EXERCISE DUR SEC: 46 SEC
STRESS POST PEAK BP: 220 MMHG
TARGET HR FORMULA: NORMAL
TEST INDICATION: NORMAL
TIME IN EXERCISE PHASE: NORMAL

## 2023-10-17 PROCEDURE — 93017 CV STRESS TEST TRACING ONLY: CPT

## 2023-10-17 PROCEDURE — 93016 CV STRESS TEST SUPVJ ONLY: CPT | Performed by: INTERNAL MEDICINE

## 2023-10-17 PROCEDURE — 93018 CV STRESS TEST I&R ONLY: CPT | Performed by: INTERNAL MEDICINE

## 2023-10-18 DIAGNOSIS — I10 PRIMARY HYPERTENSION: ICD-10-CM

## 2023-10-18 RX ORDER — LOSARTAN POTASSIUM 25 MG/1
25 TABLET ORAL DAILY
Qty: 90 TABLET | Refills: 2 | Status: SHIPPED | OUTPATIENT
Start: 2023-10-18 | End: 2023-11-17

## 2023-10-23 LAB
CHEST PAIN STATEMENT: NORMAL
MAX DIASTOLIC BP: 90 MMHG
MAX HEART RATE: 155 BPM
MAX PREDICTED HEART RATE: 171 BPM
MAX. SYSTOLIC BP: 220 MMHG
PROTOCOL NAME: NORMAL
REASON FOR TERMINATION: NORMAL
TARGET HR FORMULA: NORMAL
TEST INDICATION: NORMAL
TIME IN EXERCISE PHASE: NORMAL

## 2023-11-03 ENCOUNTER — OFFICE VISIT (OUTPATIENT)
Dept: FAMILY MEDICINE CLINIC | Facility: CLINIC | Age: 48
End: 2023-11-03
Payer: COMMERCIAL

## 2023-11-03 VITALS
WEIGHT: 286.2 LBS | SYSTOLIC BLOOD PRESSURE: 118 MMHG | HEART RATE: 64 BPM | OXYGEN SATURATION: 98 % | TEMPERATURE: 98 F | RESPIRATION RATE: 16 BRPM | BODY MASS INDEX: 40.97 KG/M2 | HEIGHT: 70 IN | DIASTOLIC BLOOD PRESSURE: 76 MMHG

## 2023-11-03 DIAGNOSIS — Z82.49 FAMILY HISTORY OF PREMATURE CAD: ICD-10-CM

## 2023-11-03 DIAGNOSIS — R73.01 IFG (IMPAIRED FASTING GLUCOSE): ICD-10-CM

## 2023-11-03 DIAGNOSIS — I10 PRIMARY HYPERTENSION: Primary | ICD-10-CM

## 2023-11-03 DIAGNOSIS — E78.2 MIXED HYPERLIPIDEMIA: ICD-10-CM

## 2023-11-03 PROCEDURE — 99214 OFFICE O/P EST MOD 30 MIN: CPT | Performed by: FAMILY MEDICINE

## 2023-11-03 NOTE — PROGRESS NOTES
Reese Moy 1975 male MRN: [id number]    Family Medicine Follow-up Visit    ASSESSMENT/PLAN  Problem List Items Addressed This Visit          Endocrine    IFG (impaired fasting glucose)    Relevant Orders    Lipid panel    Comprehensive metabolic panel    Hemoglobin A1C       Cardiovascular and Mediastinum    Primary hypertension - Primary     Patient taking losartan 25 mg daily  Reports no side effects  Home BPs are in range  Continue as prescribed. Relevant Orders    Ambulatory Referral to Cardiology       Other    Family history of premature CAD    Relevant Orders    Ambulatory Referral to Cardiology    Mixed hyperlipidemia    Relevant Orders    Lipid panel    Comprehensive metabolic panel    Hemoglobin A1C    Ambulatory Referral to Cardiology     Overall Vásquez testing was very reassuring. Given his family history, some calcium on his CT and HLD will have him consult with cardiology for further guidance. We discussed lifestyle changes in detail. He will redo labs in 3-6 months and we will monitor closely. If his labs are worse he will come back for follow up if they are improving, just continue to maintain diet and exercise. Depression Screening and Follow-up Plan: Patient was screened for depression during today's encounter. They screened negative with a PHQ-2 score of 0. No future appointments. SUBJECTIVE  CC: Follow-up (5 week)      HPI:  Reese Moy is a 51 y.o. male who presents for check up of blood pressure. HPI    Review of Systems   Constitutional:  Negative for chills and fever. HENT:  Negative for congestion, postnasal drip, rhinorrhea and sinus pain. Eyes:  Negative for photophobia and visual disturbance. Respiratory:  Negative for cough and shortness of breath. Cardiovascular:  Negative for chest pain, palpitations and leg swelling. Gastrointestinal:  Negative for abdominal pain, constipation, diarrhea, nausea and vomiting.    Genitourinary: Negative for difficulty urinating and dysuria. Musculoskeletal:  Negative for arthralgias and myalgias. Skin:  Negative for rash. Neurological:  Negative for dizziness and syncope. Historical Information   The patient history was reviewed as follows:    Past Medical History:   Diagnosis Date    Allergic     Seasonal    Heart murmur      No past surgical history on file. Family History   Problem Relation Age of Onset    Hypertension Mother     Thyroid disease Mother     Cancer Mother         Thyroid    Hypertension Father     Heart disease Father     Breast cancer Father       Social History   Social History     Substance and Sexual Activity   Alcohol Use Yes    Alcohol/week: 2.0 standard drinks of alcohol    Types: 2 Cans of beer per week    Comment: Per week     Social History     Substance and Sexual Activity   Drug Use Never     Social History     Tobacco Use   Smoking Status Former    Packs/day: 0.50    Years: 10.00    Total pack years: 5.00    Types: Cigarettes    Start date: 1/1/2003    Quit date: 5/18/2013    Years since quitting: 10.4    Passive exposure: Never   Smokeless Tobacco Never       Medications:     Current Outpatient Medications:     losartan (COZAAR) 25 mg tablet, TAKE 1 TABLET (25 MG TOTAL) BY MOUTH DAILY. , Disp: 90 tablet, Rfl: 2  Allergies   Allergen Reactions    Penicillins Diarrhea and Rash       OBJECTIVE    Vitals:   Vitals:    11/03/23 1111   BP: 118/76   BP Location: Left arm   Patient Position: Sitting   Cuff Size: Large   Pulse: 64   Resp: 16   Temp: 98 °F (36.7 °C)   SpO2: 98%   Weight: 130 kg (286 lb 3.2 oz)   Height: 5' 9.8" (1.773 m)           Physical Exam  Constitutional:       Appearance: He is well-developed. HENT:      Head: Normocephalic and atraumatic. Right Ear: External ear normal.      Left Ear: External ear normal.   Eyes:      Extraocular Movements: Extraocular movements intact.       Conjunctiva/sclera: Conjunctivae normal.   Cardiovascular: Rate and Rhythm: Normal rate and regular rhythm. Heart sounds: Normal heart sounds. No murmur heard. Pulmonary:      Effort: Pulmonary effort is normal. No respiratory distress. Breath sounds: Normal breath sounds. No wheezing. Musculoskeletal:         General: Normal range of motion. Cervical back: Normal range of motion and neck supple. Skin:     General: Skin is warm and dry. Neurological:      Mental Status: He is alert and oriented to person, place, and time.    Psychiatric:         Behavior: Behavior normal.            Labs:        DO Fidelia    11/3/2023

## 2023-11-03 NOTE — ASSESSMENT & PLAN NOTE
Patient taking losartan 25 mg daily  Reports no side effects  Home BPs are in range  Continue as prescribed.

## 2024-01-08 DIAGNOSIS — M25.519 SHOULDER PAIN, UNSPECIFIED CHRONICITY, UNSPECIFIED LATERALITY: Primary | ICD-10-CM

## 2024-01-16 ENCOUNTER — OFFICE VISIT (OUTPATIENT)
Dept: OBGYN CLINIC | Facility: CLINIC | Age: 49
End: 2024-01-16
Payer: COMMERCIAL

## 2024-01-16 ENCOUNTER — APPOINTMENT (OUTPATIENT)
Dept: RADIOLOGY | Facility: CLINIC | Age: 49
End: 2024-01-16
Payer: COMMERCIAL

## 2024-01-16 VITALS
BODY MASS INDEX: 41.52 KG/M2 | DIASTOLIC BLOOD PRESSURE: 90 MMHG | HEART RATE: 80 BPM | SYSTOLIC BLOOD PRESSURE: 158 MMHG | HEIGHT: 70 IN | WEIGHT: 290 LBS

## 2024-01-16 DIAGNOSIS — M25.519 SHOULDER PAIN, UNSPECIFIED CHRONICITY, UNSPECIFIED LATERALITY: ICD-10-CM

## 2024-01-16 DIAGNOSIS — S49.91XA INJURY OF RIGHT SHOULDER, INITIAL ENCOUNTER: ICD-10-CM

## 2024-01-16 DIAGNOSIS — S46.001A ROTATOR CUFF INJURY, RIGHT, INITIAL ENCOUNTER: ICD-10-CM

## 2024-01-16 DIAGNOSIS — R29.898 WEAKNESS OF SHOULDER: ICD-10-CM

## 2024-01-16 DIAGNOSIS — M25.511 ACUTE PAIN OF RIGHT SHOULDER: Primary | ICD-10-CM

## 2024-01-16 DIAGNOSIS — M25.511 ACUTE PAIN OF RIGHT SHOULDER: ICD-10-CM

## 2024-01-16 PROCEDURE — 73030 X-RAY EXAM OF SHOULDER: CPT

## 2024-01-16 PROCEDURE — 99203 OFFICE O/P NEW LOW 30 MIN: CPT | Performed by: PHYSICIAN ASSISTANT

## 2024-01-16 RX ORDER — LOSARTAN POTASSIUM 25 MG/1
25 TABLET ORAL DAILY
COMMUNITY

## 2024-01-16 NOTE — PROGRESS NOTES
Orthopaedic Surgery - Office Note  Dixon Schaeffer (48 y.o. male)   : 1975   MRN: [id number]  Encounter Date: 2024    No chief complaint on file.        Assessment/Plan  Diagnoses and all orders for this visit:    Acute pain of right shoulder  -     XR shoulder 2+ vw right; Future  -     MRI shoulder right wo contrast; Future  -     Ambulatory Referral to Orthopedic Surgery; Future  -     Ambulatory Referral to Physical Therapy; Future    Injury of right shoulder, initial encounter  -     XR shoulder 2+ vw right; Future  -     Ambulatory Referral to Orthopedic Surgery  -     MRI shoulder right wo contrast; Future  -     Ambulatory Referral to Orthopedic Surgery; Future  -     Ambulatory Referral to Physical Therapy; Future    Weakness of shoulder    Rotator cuff injury, right, initial encounter    Other orders  -     losartan (COZAAR) 25 mg tablet; Take 25 mg by mouth daily    The diagnosis as well as treatment options were reviewed with the patient in the office today.  I discussed I am concerned for a rotator cuff injury (possible subscapularis) due to the mechanism of injury and trauma.  He has left with significant weakness and I would recommend an MRI to rule out a full-thickness rotator cuff tear and aid in surgical planning purposes.    He will ice the shoulder for comfort 20 minutes on 1 hour off 3 times a day.  He may use an oral anti-inflammatory such as Aleve 1 tablet twice daily with food stopping and calling if any stomach upset occurs.    He will be started in physical therapy to begin working on his range of motion and strength.         Return for Recheck with orthopedic surgeon to discuss MRI of the shoulder.        History of Present Illness  This is a new patient with right shoulder pain.  He had an injury in 2023 when he was lifting overhead and felt a pop.  He has had progressive loss of motion and pain with overhead lifting since that time.  He discussed the symptoms with  "his PCP via message and is here today for evaluation and treatment.  He has had no treatment to the shoulder.  He is right-hand dominant.  He denies any problems with the shoulder in the past.  Patient reports difficulty with overhead motion and reaching behind his back since the injury.  The pain is in the front of the shoulder in the lateral aspect of the shoulder.  He denies any neck pain or radiating symptoms.  He reports there is definitive pop and ripping sensation in the front of the shoulder after the injury but there was never any bruising.    Review of Systems  Pertinent items are noted in HPI.  All other systems were reviewed and are negative.    Physical Exam  /90   Pulse 80   Ht 5' 9.8\" (1.773 m)   Wt 132 kg (290 lb)   BMI 41.85 kg/m²   Cons: Appears well.  No apparent distress.  Psych: Alert. Oriented x3.  Mood and affect normal.    Right shoulder has no skin breakdown lesions or signs of infection.  There is no ecchymosis or Carlos deformity.  He is tender to palpation in the bicipital groove.  Active forward flexion is to 140 degrees with end motion pain.  Passively to 165 degrees.  He has a positive empty can test with supraspinatus weakness.  He has weakness with liftoff testing.  He has no instability to anterior and posterior compression glide testing.  He has a positive impingement sign.  Internal rotation and external rotation strength is decreased to 4- out of 5.  Elbow exam is unremarkable.  Bicep strength is 5 out of 5.  He is neurovascular intact in the right upper extremity.    X-rays performed in the office today 3 views of the right shoulder show no acute fractures or dislocations.  Mild AC joint degenerative changes are seen.  X-rays were reviewed from an orthopedic standpoint will await official radiologist interpretation           Studies Reviewed  PCP phone messages were reviewed by myself in the office today.    Procedures  No procedures today.    Medical, Surgical, " Family, and Social History  The patient's medical history, family history, and social history, were reviewed and updated as appropriate.    Past Medical History:   Diagnosis Date    Allergic     Seasonal    Heart murmur        History reviewed. No pertinent surgical history.    Family History   Problem Relation Age of Onset    Hypertension Mother     Thyroid disease Mother     Cancer Mother         Thyroid    Hypertension Father     Heart disease Father     Breast cancer Father        Social History     Occupational History    Not on file   Tobacco Use    Smoking status: Former     Current packs/day: 0.00     Average packs/day: 0.5 packs/day for 10.4 years (5.2 ttl pk-yrs)     Types: Cigarettes     Start date: 1/1/2003     Quit date: 5/18/2013     Years since quitting: 10.6     Passive exposure: Never    Smokeless tobacco: Never   Vaping Use    Vaping status: Never Used   Substance and Sexual Activity    Alcohol use: Yes     Alcohol/week: 2.0 standard drinks of alcohol     Types: 2 Cans of beer per week     Comment: Per week    Drug use: Never    Sexual activity: Yes     Partners: Female     Birth control/protection: Diaphragm, Male Sterilization       Allergies   Allergen Reactions    Penicillins Diarrhea and Rash         Current Outpatient Medications:     losartan (COZAAR) 25 mg tablet, Take 25 mg by mouth daily, Disp: , Rfl:     losartan (COZAAR) 25 mg tablet, TAKE 1 TABLET (25 MG TOTAL) BY MOUTH DAILY., Disp: 90 tablet, Rfl: 2      Nikolai Beatty PA-C

## 2024-01-19 ENCOUNTER — EVALUATION (OUTPATIENT)
Dept: PHYSICAL THERAPY | Facility: CLINIC | Age: 49
End: 2024-01-19
Payer: COMMERCIAL

## 2024-01-19 DIAGNOSIS — M25.511 ACUTE PAIN OF RIGHT SHOULDER: Primary | ICD-10-CM

## 2024-01-19 DIAGNOSIS — S49.91XD INJURY OF RIGHT SHOULDER, SUBSEQUENT ENCOUNTER: ICD-10-CM

## 2024-01-19 PROCEDURE — 97161 PT EVAL LOW COMPLEX 20 MIN: CPT | Performed by: PHYSICAL THERAPIST

## 2024-01-19 PROCEDURE — 97140 MANUAL THERAPY 1/> REGIONS: CPT | Performed by: PHYSICAL THERAPIST

## 2024-01-19 NOTE — PROGRESS NOTES
PT Evaluation     Today's date: 2024  Patient name: Dixon Schaeffer  : 1975  MRN: [id number]  Referring provider: Nikolai Beatty PA*  Dx:   Encounter Diagnosis     ICD-10-CM    1. Acute pain of right shoulder  M25.511 Ambulatory Referral to Physical Therapy      2. Injury of right shoulder, subsequent encounter  S49.91XD Ambulatory Referral to Physical Therapy                     Assessment  Assessment details: Dixon Schaeffer is a 48 y.o. male who presents with pain, decreased strength, decreased ROM, and decreased joint mobility. Due to these impairments, patient has difficulty performing a/iadls, recreational activities, work-related activities, and engaging in social activities. Patient's clinical presentation is consistent with their referring diagnosis of Acute pain of right shoulder, Injury of right shoulder, subsequent encounter. Patient has been educated in home exercise program and plan of care. Patient would benefit from skilled physical therapy services to address their aforementioned functional limitations and progress towards prior level of function and independence with home exercise program. Evaluation and YULIET is concerning for both labral and subscapular issues, and would definitely benefit from upcoming MRI to rule these out.   Impairments: abnormal muscle firing, abnormal or restricted ROM, activity intolerance, impaired physical strength, lacks appropriate home exercise program, pain with function and scapular dyskinesis    Symptom irritability: highUnderstanding of Dx/Px/POC: good   Prognosis: good    Goals  Short Term Goals:  Target Date 4 weeks  1. Pt will initiate and advance HEP.  2. Pt will have < 3/10 pain  3. Pt will have full prom of the right shoulder  4. Pt will be able to sleep 50% of the night    Long Term Goals:  Target Date 8 weeks  1. Pt will demonstrate independence in HEP.  2. Pt will have <1/10 pain  3. Pt will have full c/s and B UE strength  4. Pt will be  able to pt will be able to sleep through the night        Plan  Patient would benefit from: skilled PT  Planned modality interventions: cryotherapy and thermotherapy: hydrocollator packs  Planned therapy interventions: manual therapy, home exercise program, neuromuscular re-education, strengthening, stretching, therapeutic activities, therapeutic exercise, motor coordination training, muscle pump exercises and patient education  Frequency: 1x week  Duration in weeks: 8  Plan of Care beginning date: 2024  Plan of Care expiration date: 3/15/2024  Treatment plan discussed with: patient      Subjective Evaluation    History of Present Illness  Mechanism of injury: Pt notes that back in the middle of December he was pulling apart a piece of furniture. He was holding it at chest high, and pulling apart. He felt a pop. No significant pain initially but built over the next couple of days. Since that time significant difficulty sleeping through the night. He has been adjusting what he does at work. 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 Pt notes that he has been trying ice, and advil but nothing really helps. Pt notes that donning boom is painful, pulling up pants is painful. Pain is located in the shoulder and radiates down the bicep. No c/s pain or n/t  Patient Goals  Patient goals for therapy: decreased pain, increased motion, independence with ADLs/IADLs, increased strength and return to sport/leisure activities    Pain  At worst pain ratin  Quality: dull ache, discomfort, radiating, tight and sharp        Objective     Static Posture     Shoulders  Depressed and rounded.    Postural Observations  Seated posture: fair  Standing posture: fair  Correction of posture: has no consistent effect      Observations     Right Shoulder  Positive for atrophy and spasms.     Additional Observation Details  Bilaterally internally rotated at the shoulder    Palpation     Right   No palpable tenderness to the infraspinatus, lower  trapezius and middle trapezius.   Tenderness of the biceps, pectoralis major and subscapularis.     Tenderness     Right Shoulder  Tenderness in the biceps tendon (proximal) and bicipital groove.     Active Range of Motion     Right Shoulder   Flexion: 135 degrees with pain  Abduction: 130 degrees with pain  Internal rotation BTB: L5 with pain    Passive Range of Motion     Right Shoulder   Flexion: 145 degrees with pain  External rotation 45°: 60 degrees   Internal rotation 45°: 45 degrees with pain    Strength/Myotome Testing     Right Shoulder     Planes of Motion   Flexion: 3-   Abduction: 3-   External rotation at 0°: 3+   Internal rotation at 0°: 3+     Tests     Right Shoulder   Positive belly press, Hawkin's, painful arc and bicep load .   Negative apprehension, external rotation lag sign and passive horizontal adduction.              Precautions: none      Manuals 1/19            Right pectoral subscap, and infra stm DB                                                   Neuro Re-Ed                                                                                                        Ther Ex                                                                                                                     Ther Activity                                       Gait Training                                       Modalities             ld iso x6 5''x5 ea

## 2024-01-26 ENCOUNTER — OFFICE VISIT (OUTPATIENT)
Dept: PHYSICAL THERAPY | Facility: CLINIC | Age: 49
End: 2024-01-26
Payer: COMMERCIAL

## 2024-01-26 DIAGNOSIS — M25.511 ACUTE PAIN OF RIGHT SHOULDER: Primary | ICD-10-CM

## 2024-01-26 DIAGNOSIS — S49.91XD INJURY OF RIGHT SHOULDER, SUBSEQUENT ENCOUNTER: ICD-10-CM

## 2024-01-26 PROCEDURE — 97110 THERAPEUTIC EXERCISES: CPT | Performed by: PHYSICAL THERAPIST

## 2024-01-26 PROCEDURE — 97140 MANUAL THERAPY 1/> REGIONS: CPT | Performed by: PHYSICAL THERAPIST

## 2024-01-26 NOTE — PROGRESS NOTES
Daily Note     Today's date: 2024  Patient name: Dixon Schaeffer  : 1975  MRN: [id number]  Referring provider: Nikolai Beatty PA*  Dx:   Encounter Diagnosis     ICD-10-CM    1. Acute pain of right shoulder  M25.511       2. Injury of right shoulder, subsequent encounter  S49.91XD                      Subjective: pt notes that his shoulder has been sore with movement still. No change for better with the exercises. Also has been pushing it at work with spackling       Objective: See treatment diary below      Assessment: did adjust pt's Hep to take out iso, and perform more arom exercises as there were more tolerable for him. Pt had consistent pain with supine passive Er. Initially had pain with IR as well but improved with stm     Plan: Continue per plan of care.      Precautions: none      Manuals            Right pectoral subscap, and infra stm DB Db                                                  Neuro Re-Ed                                                                                                        Ther Ex             Supine H abd  x10           Supine flex  x10           S/l abd  x10           S/l Er   x15           B/o row  x10           B/l ext  x15           B/0 H abd  x10                        Ther Activity                                       Gait Training                                       Modalities             Shld iso x6 5''x5 ea

## 2024-02-02 ENCOUNTER — OFFICE VISIT (OUTPATIENT)
Dept: PHYSICAL THERAPY | Facility: CLINIC | Age: 49
End: 2024-02-02
Payer: COMMERCIAL

## 2024-02-02 DIAGNOSIS — M25.511 ACUTE PAIN OF RIGHT SHOULDER: Primary | ICD-10-CM

## 2024-02-02 DIAGNOSIS — S49.91XD INJURY OF RIGHT SHOULDER, SUBSEQUENT ENCOUNTER: ICD-10-CM

## 2024-02-02 PROCEDURE — 97110 THERAPEUTIC EXERCISES: CPT

## 2024-02-02 PROCEDURE — 97140 MANUAL THERAPY 1/> REGIONS: CPT

## 2024-02-02 NOTE — PROGRESS NOTES
Daily Note     Today's date: 2024  Patient name: Dixon Schaeffer  : 1975  MRN: [id number]  Referring provider: Nikolai Beatty PA*  Dx:   Encounter Diagnosis     ICD-10-CM    1. Acute pain of right shoulder  M25.511       2. Injury of right shoulder, subsequent encounter  S49.91XD                      Subjective: pt states no change in sxs since last visit.  Did tolerate the AROM better than isometrics but still painful during them. Difficulty sleeping.      Objective: See treatment diary below      Assessment: Pt with decreased motion with all exercises 2* to increased pain radiating down arm into biceps.  Exercises do increase his pain in shoulder despite decreased motion arc. Patient with tightness in neck 2* to sleeping, was shown how to stretch UT and levator to loosen.        Plan: Continue per plan of care.   Pt is see Dr Smallwood next week and will cont as per ortho     Precautions: none      Manuals  2/2          Right pectoral subscap, and infra stm DB Db DL          Stm UT,levator   DL                                    Neuro Re-Ed                                                                                                        Ther Ex             Supine H abd  x10 x10          Supine flex  x10 x10          S/l abd  x10 x10          S/l Er   x15 x10          B/o row  x10 x10          B/l ext  x15 x15          B/0 H abd  x10 x10                       Ther Activity                                       Gait Training                                       Modalities             Shld iso x6 5''x5 ea

## 2024-02-08 ENCOUNTER — OFFICE VISIT (OUTPATIENT)
Dept: OBGYN CLINIC | Facility: CLINIC | Age: 49
End: 2024-02-08
Payer: COMMERCIAL

## 2024-02-08 VITALS
SYSTOLIC BLOOD PRESSURE: 130 MMHG | WEIGHT: 290 LBS | DIASTOLIC BLOOD PRESSURE: 82 MMHG | HEIGHT: 70 IN | BODY MASS INDEX: 41.52 KG/M2

## 2024-02-08 DIAGNOSIS — S49.91XA INJURY OF RIGHT SHOULDER, INITIAL ENCOUNTER: ICD-10-CM

## 2024-02-08 DIAGNOSIS — M25.511 ACUTE PAIN OF RIGHT SHOULDER: ICD-10-CM

## 2024-02-08 DIAGNOSIS — S46.911A STRAIN OF RIGHT SHOULDER, INITIAL ENCOUNTER: Primary | ICD-10-CM

## 2024-02-08 PROCEDURE — 99213 OFFICE O/P EST LOW 20 MIN: CPT | Performed by: ORTHOPAEDIC SURGERY

## 2024-02-08 PROCEDURE — 20610 DRAIN/INJ JOINT/BURSA W/O US: CPT | Performed by: ORTHOPAEDIC SURGERY

## 2024-02-08 RX ORDER — BETAMETHASONE SODIUM PHOSPHATE AND BETAMETHASONE ACETATE 3; 3 MG/ML; MG/ML
6 INJECTION, SUSPENSION INTRA-ARTICULAR; INTRALESIONAL; INTRAMUSCULAR; SOFT TISSUE
Status: COMPLETED | OUTPATIENT
Start: 2024-02-08 | End: 2024-02-08

## 2024-02-08 RX ORDER — LIDOCAINE HYDROCHLORIDE 10 MG/ML
5 INJECTION, SOLUTION INFILTRATION; PERINEURAL
Status: COMPLETED | OUTPATIENT
Start: 2024-02-08 | End: 2024-02-08

## 2024-02-08 RX ADMIN — BETAMETHASONE SODIUM PHOSPHATE AND BETAMETHASONE ACETATE 6 MG: 3; 3 INJECTION, SUSPENSION INTRA-ARTICULAR; INTRALESIONAL; INTRAMUSCULAR; SOFT TISSUE at 10:00

## 2024-02-08 RX ADMIN — LIDOCAINE HYDROCHLORIDE 5 ML: 10 INJECTION, SOLUTION INFILTRATION; PERINEURAL at 10:00

## 2024-02-08 NOTE — PROGRESS NOTES
Assessment:     1. Strain of right shoulder, initial encounter    2. Acute pain of right shoulder    3. Injury of right shoulder, initial encounter        Plan:     Problem List Items Addressed This Visit          Musculoskeletal and Integument    Strain of right shoulder - Primary     Findings consistent with right shoulder rotator cuff strain. Imaging and prognosis reviewed with patient. At this time no concern for rotator cuff tear, possible biceps strain. Patient will continue with physical therapy to rehab shoulder. Avoid any repetitive strenuous activities with right arm. He was given subacromial cortisone injection to calm inflammation, tolerated well, cold compress today. Continue with OTC anti inflammatories, also use voltaren gel or aspecreme. See patient back in 6-8 weeks. If no improvement may then consider MRI.  All patient's questions were answered to his satisfaction.  This note is created using dictation transcription.  It may contain typographical errors, grammatical errors, improperly dictated words, background noise and other errors.         Relevant Medications    betamethasone acetate-betamethasone sodium phosphate (CELESTONE) injection 6 mg (Completed)    lidocaine (XYLOCAINE) 1 % injection 5 mL (Completed)    Other Relevant Orders    Large joint arthrocentesis: R subacromial bursa (Completed)     Other Visit Diagnoses       Acute pain of right shoulder        Injury of right shoulder, initial encounter               Subjective:     Patient ID: Dixon Schaeffer is a 48 y.o. male.  Chief Complaint:  48 yr old male in for evaluation of right shoulder pain. Referred by SINDY Beatty. He had an injury in December 2023 when he was pulling pieces of wood apart with his hands across his chest and felt a pop in shoulder with out pain.  Over next few weeks he had progressive loss of motion and pain with overhead lifting since that time. He has attended 3 sessions of physical therapy but no significant change in  symptoms. Pain anterolateral region of shoulder and will radiate down into biceps and not below elbow. Pain can be sharp and acute depending on movement. Unable to sleep on right side at night. Denies any neck pain, numbness or tinging in arm.  NSAID for pain control.     Allergy:  Allergies   Allergen Reactions    Penicillins Diarrhea and Rash     Medications:  all current active meds have been reviewed  Past Medical History:  Past Medical History:   Diagnosis Date    Allergic     Seasonal    Heart murmur      Past Surgical History:  History reviewed. No pertinent surgical history.  Family History:  Family History   Problem Relation Age of Onset    Hypertension Mother     Thyroid disease Mother     Cancer Mother         Thyroid    Hypertension Father     Heart disease Father     Breast cancer Father      Social History:  Social History     Substance and Sexual Activity   Alcohol Use Yes    Alcohol/week: 2.0 standard drinks of alcohol    Types: 2 Cans of beer per week    Comment: Per week     Social History     Substance and Sexual Activity   Drug Use Never     Social History     Tobacco Use   Smoking Status Former    Current packs/day: 0.00    Average packs/day: 0.5 packs/day for 10.4 years (5.2 ttl pk-yrs)    Types: Cigarettes    Start date: 1/1/2003    Quit date: 5/18/2013    Years since quitting: 10.7    Passive exposure: Never   Smokeless Tobacco Never     Review of Systems   Constitutional:  Negative for chills and fever.   HENT:  Negative for ear pain and sore throat.    Eyes:  Negative for pain and visual disturbance.   Respiratory:  Negative for cough and shortness of breath.    Cardiovascular:  Negative for chest pain and palpitations.   Gastrointestinal:  Negative for abdominal pain and vomiting.   Genitourinary:  Negative for dysuria and hematuria.   Musculoskeletal:  Positive for arthralgias (right shoulder). Negative for back pain, joint swelling and neck pain.   Skin:  Negative for color change and  "rash.   Neurological:  Negative for seizures and syncope.   Psychiatric/Behavioral: Negative.     All other systems reviewed and are negative.        Objective:  BP Readings from Last 1 Encounters:   02/08/24 130/82      Wt Readings from Last 1 Encounters:   02/08/24 132 kg (290 lb)      BMI:   Estimated body mass index is 41.61 kg/m² as calculated from the following:    Height as of this encounter: 5' 10\" (1.778 m).    Weight as of this encounter: 132 kg (290 lb).  BSA:   Estimated body surface area is 2.45 meters squared as calculated from the following:    Height as of this encounter: 5' 10\" (1.778 m).    Weight as of this encounter: 132 kg (290 lb).   Physical Exam  Vitals and nursing note reviewed.   Constitutional:       Appearance: Normal appearance. He is well-developed.   HENT:      Head: Normocephalic and atraumatic.      Right Ear: External ear normal.      Left Ear: External ear normal.   Eyes:      Extraocular Movements: Extraocular movements intact.      Conjunctiva/sclera: Conjunctivae normal.   Pulmonary:      Effort: Pulmonary effort is normal.   Musculoskeletal:         General: Tenderness (right shoulder arthralgia) present.      Cervical back: Neck supple.   Skin:     General: Skin is warm and dry.   Neurological:      Mental Status: He is alert and oriented to person, place, and time.      Deep Tendon Reflexes: Reflexes are normal and symmetric.   Psychiatric:         Mood and Affect: Mood normal.         Behavior: Behavior normal.       Right Shoulder Exam     Tenderness   The patient is experiencing tenderness in the biceps tendon.    Range of Motion   Active abduction:  abnormal Right shoulder active abduction: pain.  Passive abduction:  normal   Forward flexion:  normal Right shoulder forward flexion: pain.  Internal rotation 0 degrees:  Sacrum (pain)     Muscle Strength   Abduction: 5/5   Internal rotation: 5/5   External rotation: 5/5   Biceps: 5/5     Tests   Cross arm: " positive  Impingement: positive  Drop arm: negative    Other   Erythema: absent  Scars: absent  Sensation: normal  Pulse: present    Comments:  Negative speeds, and o'briens             Xr right shoulder well maintained joint spaces, no fracture or dislocation evident, possible small calcification superolateral humeral head.     Large joint arthrocentesis: R subacromial bursa  Universal Protocol:  Consent: Verbal consent obtained.  Risks and benefits: risks, benefits and alternatives were discussed  Consent given by: patient  Patient understanding: patient states understanding of the procedure being performed  Site marked: the operative site was marked  Patient identity confirmed: verbally with patient  Supporting Documentation  Indications: pain   Procedure Details  Location: shoulder - R subacromial bursa  Preparation: Patient was prepped and draped in the usual sterile fashion  Needle size: 22 G  Ultrasound guidance: no  Approach: posterolateral  Medications administered: 6 mg betamethasone acetate-betamethasone sodium phosphate 6 (3-3) mg/mL; 5 mL lidocaine 1 %    Patient tolerance: patient tolerated the procedure well with no immediate complications  Dressing:  Sterile dressing applied        Scribe Attestation      I,:  Jenaro Hernandez am acting as a scribe while in the presence of the attending physician.:       I,:  Luba Smallwood MD personally performed the services described in this documentation    as scribed in my presence.:

## 2024-02-08 NOTE — ASSESSMENT & PLAN NOTE
Findings consistent with right shoulder rotator cuff strain. Imaging and prognosis reviewed with patient. At this time no concern for rotator cuff tear, possible biceps strain. Patient will continue with physical therapy to rehab shoulder. Avoid any repetitive strenuous activities with right arm. He was given subacromial cortisone injection to calm inflammation, tolerated well, cold compress today. Continue with OTC anti inflammatories, also use voltaren gel or aspecreme. See patient back in 6-8 weeks. If no improvement may then consider MRI.  All patient's questions were answered to his satisfaction.  This note is created using dictation transcription.  It may contain typographical errors, grammatical errors, improperly dictated words, background noise and other errors.

## 2024-02-09 ENCOUNTER — OFFICE VISIT (OUTPATIENT)
Dept: PHYSICAL THERAPY | Facility: CLINIC | Age: 49
End: 2024-02-09
Payer: COMMERCIAL

## 2024-02-09 DIAGNOSIS — S49.91XD INJURY OF RIGHT SHOULDER, SUBSEQUENT ENCOUNTER: ICD-10-CM

## 2024-02-09 DIAGNOSIS — M25.511 ACUTE PAIN OF RIGHT SHOULDER: Primary | ICD-10-CM

## 2024-02-09 PROCEDURE — 97110 THERAPEUTIC EXERCISES: CPT

## 2024-02-09 PROCEDURE — 97140 MANUAL THERAPY 1/> REGIONS: CPT

## 2024-02-09 NOTE — PROGRESS NOTES
Daily Note     Today's date: 2024  Patient name: Dixon Schaeffer  : 1975  MRN: [id number]  Referring provider: Nikolai Beatty PA*  Dx:   Encounter Diagnosis     ICD-10-CM    1. Acute pain of right shoulder  M25.511       2. Injury of right shoulder, subsequent encounter  S49.91XD                      Subjective: pt states that he had injection from Dr Smallwood, is to avoid lifting above head and cont with PT.  Notes that he is still with pain.      Objective: See treatment diary below      Assessment: Tolerated treatment with less tightness in biceps region.  PROM with empty end feel 2* pain. Patient was able to perform resistance for postural strengthening and stability without  increased sxs.  Discussed use of home gym, to avoid any overhead motion with it but can use to not allow pain to continue to rise but slight increase is ok.  Also emphasis on form and shoulder placement  Pt verbalized understanding.       Plan: Continue per plan of care.      Precautions: none      Manuals /         Right pectoral subscap, and infra stm DB Db DL DL         Stm UT,levator   DL DL         PROM    DL                      Neuro Re-Ed             Tband row    Blue x10         Tband ext    Blue x10                                                                          Ther Ex             Supine H abd  x10 x10 x10         Supine flex  x10 x10 x10         S/l abd  x10 x10 x10         S/l Er   x15 x10 x10         B/o row  x10 x10 x10         B/l ext  x15 x15 x10         B/0 H abd  x10 x10 x10                      Ther Activity                                       Gait Training                                       Modalities             Shld iso x6 5''x5 ea

## 2024-02-15 ENCOUNTER — OFFICE VISIT (OUTPATIENT)
Dept: PHYSICAL THERAPY | Facility: CLINIC | Age: 49
End: 2024-02-15
Payer: COMMERCIAL

## 2024-02-15 DIAGNOSIS — M25.511 ACUTE PAIN OF RIGHT SHOULDER: Primary | ICD-10-CM

## 2024-02-15 DIAGNOSIS — S49.91XD INJURY OF RIGHT SHOULDER, SUBSEQUENT ENCOUNTER: ICD-10-CM

## 2024-02-15 PROCEDURE — 97140 MANUAL THERAPY 1/> REGIONS: CPT | Performed by: PHYSICAL THERAPY ASSISTANT

## 2024-02-15 PROCEDURE — 97110 THERAPEUTIC EXERCISES: CPT | Performed by: PHYSICAL THERAPY ASSISTANT

## 2024-02-15 NOTE — PROGRESS NOTES
Daily Note     Today's date: 2/15/2024  Patient name: Dixno Schaeffer  : 1975  MRN: [id number]  Referring provider: Nikolai Beatty PA*  Dx:   Encounter Diagnosis     ICD-10-CM    1. Acute pain of right shoulder  M25.511       2. Injury of right shoulder, subsequent encounter  S49.91XD                      Subjective: pt states that injection he had last week had minimal to no effect on pain levels.  Pt states he continues with HEP and is compliant with lifting restrictions in his job to the best of his ability.   Objective: See treatment diary below      Assessment: Tolerated treatment with less tightness in biceps region.  PROM with empty end feel 2* pain. Able to progress reps this session with reports of increased fatigue and no increased pain.  Pt would benefit from continued therapy to address pain and decreased strength for return to PLOF.    Plan: Continue per plan of care.      Precautions: none      Manuals 1/19 1/25 2/2 2/9 2/15        Right pectoral subscap, and infra stm DB Db DL DL RK        Stm UT,levator   DL DL RK        PROM    DL RK                     Neuro Re-Ed             Tband row    Blue x10 Blue  2x10        Tband ext    Blue x10 Blue 2x10                                                                         Ther Ex             Supine H abd  x10 x10 x10 x15        Supine flex  x10 x10 x10 x15        S/l abd  x10 x10 x10 x15        S/l Er   x15 x10 x10 x15        B/o row  x10 x10 x10 x15        B/l ext  x15 x15 x10 x15        B/0 H abd  x10 x10 x10 x15        B/l ER     GTB x15        Ther Activity                                       Gait Training                                       Modalities             Shld iso x6 5''x5 ea

## 2024-02-21 ENCOUNTER — OFFICE VISIT (OUTPATIENT)
Dept: PHYSICAL THERAPY | Facility: CLINIC | Age: 49
End: 2024-02-21
Payer: COMMERCIAL

## 2024-02-21 DIAGNOSIS — S49.91XD INJURY OF RIGHT SHOULDER, SUBSEQUENT ENCOUNTER: ICD-10-CM

## 2024-02-21 DIAGNOSIS — M25.511 ACUTE PAIN OF RIGHT SHOULDER: Primary | ICD-10-CM

## 2024-02-21 PROCEDURE — 97140 MANUAL THERAPY 1/> REGIONS: CPT | Performed by: PHYSICAL THERAPIST

## 2024-02-21 PROCEDURE — 97110 THERAPEUTIC EXERCISES: CPT | Performed by: PHYSICAL THERAPIST

## 2024-02-21 NOTE — PROGRESS NOTES
Daily Note     Today's date: 2024  Patient name: Dixon Schaeffer  : 1975  MRN: [id number]  Referring provider: Nikolai Beatty PA*  Dx:   Encounter Diagnosis     ICD-10-CM    1. Acute pain of right shoulder  M25.511       2. Injury of right shoulder, subsequent encounter  S49.91XD                      Subjective: pt notes that he still doesn't have any relief in the shoulder. No change with the injection for the better. Is getting a burning down the bicep now that lasts for about 10 sec post reaching motions      Objective: See treatment diary below      Assessment: pt did well with prone/b/o exercises today, as well as s/l Er, but continues to have pain with flexion exercises in either s/l or upright position. Did add in prone/b/o flexion to hep though as pt didn't have pain in this position. Advised pt that he may benefit from moving up his ortho appt at this point given his job demands as well as his limited to no improvement with both PT, and injection at this point.       Plan: Continue per plan of care.      Precautions: none      Manuals  2/2 2/9 2/15 2/21       Right pectoral subscap, and infra stm DB Db DL DL RK DB       Stm UT,levator   DL DL RK DB       PROM    DL RK DB       Inf and ant mobs      DB       Neuro Re-Ed             Tband row    Blue x10 Blue  2x10        Tband ext    Blue x10 Blue 2x10                                                                         Ther Ex             Supine H abd  x10 x10 x10 x15        Supine flex  x10 x10 x10 x15        S/l abd  x10 x10 x10 x15        S/l Er   x15 x10 x10 x15 2x10       B/o row  x10 x10 x10 x15 Flex 2x10       B/l ext  x15 x15 x10 x15 2x10       B/0 H abd  x10 x10 x10 x15 2x10       B/l ER     GTB x15        Ther Activity                                       Gait Training                                       Modalities             Shld iso x6 5''x5 ea

## 2024-02-22 ENCOUNTER — OFFICE VISIT (OUTPATIENT)
Dept: OBGYN CLINIC | Facility: CLINIC | Age: 49
End: 2024-02-22
Payer: COMMERCIAL

## 2024-02-22 VITALS
SYSTOLIC BLOOD PRESSURE: 130 MMHG | BODY MASS INDEX: 41.37 KG/M2 | DIASTOLIC BLOOD PRESSURE: 80 MMHG | HEIGHT: 70 IN | WEIGHT: 289 LBS

## 2024-02-22 DIAGNOSIS — M24.811 INTERNAL DERANGEMENT OF RIGHT SHOULDER: ICD-10-CM

## 2024-02-22 DIAGNOSIS — S46.911D STRAIN OF RIGHT SHOULDER, SUBSEQUENT ENCOUNTER: Primary | ICD-10-CM

## 2024-02-22 PROCEDURE — 99214 OFFICE O/P EST MOD 30 MIN: CPT | Performed by: ORTHOPAEDIC SURGERY

## 2024-02-22 NOTE — PROGRESS NOTES
Assessment:     1. Strain of right shoulder, subsequent encounter    2. Internal derangement of right shoulder          Plan:     Problem List Items Addressed This Visit          Musculoskeletal and Integument    Strain of right shoulder - Primary    Relevant Orders    MRI shoulder right wo contrast     Other Visit Diagnoses       Internal derangement of right shoulder        Relevant Orders    MRI shoulder right wo contrast        Findings consistent with right shoulder strain, possible rotator cuff vs labral tear. Since cortisone injection gave no relief and he continues to note pain with physical therapy with no improvement in overall function with pain MRI of right shoulder was ordered. Patient can hold on PT. Avoid any strenuous activities with right arm. See patient back to review MRI of right shoulder to determine future course of treatment. OTC anti inflammatories as needed for pain.  All patient's questions were answered to his satisfaction.  This note is created using dictation transcription.  It may contain typographical errors, grammatical errors, improperly dictated words, background noise and other errors.     Subjective:     Patient ID: Dixon Schaeffer is a 48 y.o. male.  Chief Complaint:  48 yr old male in for follow up of right shoulder pain.  He had an injury in December 2023 when he was pulling pieces of wood apart with his hands across his chest and felt a pop in shoulder with out pain.  He was given CSI and has attended 6 sessions of PT. He states no improvement in regards to pain, function. Pain is anterior based and down into biceps. He will note limitations lifting arm at times. Burning, sharp sensation in regards to pain. Wishing to discuss next step in treatment.     Allergy:  Allergies   Allergen Reactions    Penicillins Diarrhea and Rash     Medications:  all current active meds have been reviewed  Past Medical History:  Past Medical History:   Diagnosis Date    Allergic     Seasonal     "Heart murmur      Past Surgical History:  History reviewed. No pertinent surgical history.  Family History:  Family History   Problem Relation Age of Onset    Hypertension Mother     Thyroid disease Mother     Cancer Mother         Thyroid    Hypertension Father     Heart disease Father     Breast cancer Father      Social History:  Social History     Substance and Sexual Activity   Alcohol Use Yes    Alcohol/week: 2.0 standard drinks of alcohol    Types: 2 Cans of beer per week    Comment: Per week     Social History     Substance and Sexual Activity   Drug Use Never     Social History     Tobacco Use   Smoking Status Former    Current packs/day: 0.00    Average packs/day: 0.5 packs/day for 10.4 years (5.2 ttl pk-yrs)    Types: Cigarettes    Start date: 1/1/2003    Quit date: 5/18/2013    Years since quitting: 10.7    Passive exposure: Never   Smokeless Tobacco Never     Review of Systems   Constitutional:  Negative for chills and fever.   HENT:  Negative for ear pain and sore throat.    Eyes:  Negative for pain and visual disturbance.   Respiratory:  Negative for cough and shortness of breath.    Cardiovascular:  Negative for chest pain and palpitations.   Gastrointestinal:  Negative for abdominal pain and vomiting.   Genitourinary:  Negative for dysuria and hematuria.   Musculoskeletal:  Positive for arthralgias (right shoulder). Negative for back pain, joint swelling and neck pain.   Skin:  Negative for color change and rash.   Neurological:  Negative for seizures and syncope.   Psychiatric/Behavioral: Negative.     All other systems reviewed and are negative.        Objective:  BP Readings from Last 1 Encounters:   02/22/24 130/80      Wt Readings from Last 1 Encounters:   02/22/24 131 kg (289 lb)      BMI:   Estimated body mass index is 41.47 kg/m² as calculated from the following:    Height as of this encounter: 5' 10\" (1.778 m).    Weight as of this encounter: 131 kg (289 lb).  BSA:   Estimated body surface " "area is 2.44 meters squared as calculated from the following:    Height as of this encounter: 5' 10\" (1.778 m).    Weight as of this encounter: 131 kg (289 lb).   Physical Exam  Vitals and nursing note reviewed.   Constitutional:       Appearance: Normal appearance. He is well-developed.   HENT:      Head: Normocephalic and atraumatic.      Right Ear: External ear normal.      Left Ear: External ear normal.   Eyes:      Extraocular Movements: Extraocular movements intact.      Conjunctiva/sclera: Conjunctivae normal.   Pulmonary:      Effort: Pulmonary effort is normal.   Musculoskeletal:         General: Tenderness (right shoulder arthralgia) present.      Cervical back: Neck supple.   Skin:     General: Skin is warm and dry.   Neurological:      Mental Status: He is alert and oriented to person, place, and time.      Deep Tendon Reflexes: Reflexes are normal and symmetric.   Psychiatric:         Mood and Affect: Mood normal.         Behavior: Behavior normal.       Right Shoulder Exam     Tenderness   The patient is experiencing tenderness in the biceps tendon.    Range of Motion   Active abduction:  abnormal Right shoulder active abduction: pain.  Passive abduction:  normal   Forward flexion:  160 (pain) abnormal   Internal rotation 0 degrees:  Sacrum (pain)     Muscle Strength   Abduction: 5/5   Internal rotation: 5/5   External rotation: 5/5   Biceps: 5/5     Tests   Cross arm: positive  Impingement: positive  Drop arm: negative    Other   Erythema: absent  Scars: absent  Sensation: normal  Pulse: present    Comments:  Positive pack's           No new imaging to review     Scribe Attestation      I,:  Jenaro Hernandez am acting as a scribe while in the presence of the attending physician.:       I,:  Luba Smallwood MD personally performed the services described in this documentation    as scribed in my presence.:             "

## 2024-02-28 ENCOUNTER — CONSULT (OUTPATIENT)
Dept: CARDIOLOGY CLINIC | Facility: CLINIC | Age: 49
End: 2024-02-28
Payer: COMMERCIAL

## 2024-02-28 VITALS
HEART RATE: 74 BPM | DIASTOLIC BLOOD PRESSURE: 82 MMHG | SYSTOLIC BLOOD PRESSURE: 148 MMHG | BODY MASS INDEX: 40.94 KG/M2 | HEIGHT: 71 IN | WEIGHT: 292.4 LBS

## 2024-02-28 DIAGNOSIS — E78.2 MIXED HYPERLIPIDEMIA: ICD-10-CM

## 2024-02-28 DIAGNOSIS — Z82.49 FAMILY HISTORY OF PREMATURE CAD: ICD-10-CM

## 2024-02-28 DIAGNOSIS — I10 PRIMARY HYPERTENSION: Primary | ICD-10-CM

## 2024-02-28 DIAGNOSIS — R93.1 AGATSTON CORONARY ARTERY CALCIUM SCORE LESS THAN 100: ICD-10-CM

## 2024-02-28 PROCEDURE — 99203 OFFICE O/P NEW LOW 30 MIN: CPT | Performed by: INTERNAL MEDICINE

## 2024-02-28 PROCEDURE — 93000 ELECTROCARDIOGRAM COMPLETE: CPT | Performed by: INTERNAL MEDICINE

## 2024-02-28 RX ORDER — ATORVASTATIN CALCIUM 10 MG/1
10 TABLET, FILM COATED ORAL DAILY
Qty: 30 TABLET | Refills: 4 | Status: SHIPPED | OUTPATIENT
Start: 2024-02-28

## 2024-02-28 NOTE — PROGRESS NOTES
Cardiology Follow Up    Dixon Schaeffer  1975  [id number]  [facility] CARDIAC CATH LAB  [address] [address]  [phone]    1. Primary hypertension  Ambulatory Referral to Cardiology      2. Family history of premature CAD  Ambulatory Referral to Cardiology      3. Mixed hyperlipidemia  Ambulatory Referral to Cardiology      4. Agatston coronary artery calcium score less than 100            Interval History: Cardiology consultation.  48-year-old male who has no previous cardiac history.  Does have hypertension on low-dose ARB therapy..  His lipids last year total cholesterol 183, HDL 38, , triglycerides 147.  Patient recently undergo a CT coronary calcium score that was mildly abnormal, score of 87 mostly in the LAD territory, 71.  This was followed by a stress test, he did almost 11 minutes on the Michele protocol achieving target heart rate.  There was no EKG criteria for ischemia or symptoms.  Patient previously smoke having quit almost a decade ago.  There is family history of premature coronary disease, his father had bypass surgery at age 48, his grandfather  of myocardial infarction.  The patient currently offers no complaints, he exercises 2-3 times a week.  Previously no exertional symptoms.  Specifically denies any chest pain or dyspnea.  No orthopnea no PND.    Patient Active Problem List   Diagnosis    Family history of premature CAD    BMI 40.0-44.9, adult (HCC)    Family history of breast cancer in male    Family history of thyroid cancer    Family history of colon cancer    Primary hypertension    Mixed hyperlipidemia    IFG (impaired fasting glucose)    Strain of right shoulder    Agatston coronary artery calcium score less than 100     Past Medical History:   Diagnosis Date    Allergic     Seasonal    Heart murmur      Social History     Socioeconomic History    Marital status: /Civil  Union     Spouse name: Not on file    Number of children: Not on file    Years of education: Not on file    Highest education level: Not on file   Occupational History    Not on file   Tobacco Use    Smoking status: Former     Current packs/day: 0.00     Average packs/day: 0.5 packs/day for 10.4 years (5.2 ttl pk-yrs)     Types: Cigarettes     Start date: 1/1/2003     Quit date: 5/18/2013     Years since quitting: 10.7     Passive exposure: Never    Smokeless tobacco: Never   Vaping Use    Vaping status: Never Used   Substance and Sexual Activity    Alcohol use: Yes     Alcohol/week: 2.0 standard drinks of alcohol     Types: 2 Cans of beer per week     Comment: Per week    Drug use: Never    Sexual activity: Yes     Partners: Female     Birth control/protection: Diaphragm, Male Sterilization   Other Topics Concern    Not on file   Social History Narrative    Not on file     Social Determinants of Health     Financial Resource Strain: Not on file   Food Insecurity: Not on file   Transportation Needs: Not on file   Physical Activity: Not on file   Stress: Not on file   Social Connections: Not on file   Intimate Partner Violence: Not At Risk (11/3/2023)    Humiliation, Afraid, Rape, and Kick questionnaire     Fear of Current or Ex-Partner: No     Emotionally Abused: No     Physically Abused: No     Sexually Abused: No   Housing Stability: Not on file      Family History   Problem Relation Age of Onset    Hypertension Mother     Thyroid disease Mother     Cancer Mother         Thyroid    Hypertension Father     Heart disease Father     Breast cancer Father      No past surgical history on file.    Current Outpatient Medications:     losartan (COZAAR) 25 mg tablet, TAKE 1 TABLET (25 MG TOTAL) BY MOUTH DAILY., Disp: 90 tablet, Rfl: 2    losartan (COZAAR) 25 mg tablet, Take 25 mg by mouth daily, Disp: , Rfl:   Allergies   Allergen Reactions    Penicillins Diarrhea and Rash       Labs:  Hospital Outpatient Visit on  10/17/2023   Component Date Value    Exercise duration (min) 10/17/2023 10     Angina Index 10/17/2023 0     Exercise duration (sec) 10/17/2023 46     Estimated workload 10/17/2023 13.4     Stress Stage Reached 10/17/2023 4.0     Stress peak HR 10/17/2023 155     Max HR Percent 10/17/2023 90     Max HR 10/17/2023 155     Baseline HR 10/17/2023 79     Post peak BP 10/17/2023 220.0     Rate Pressure Product 10/17/2023 34,100.0     Protocol Name 10/17/2023 YURIDIA     Time In Exercise Phase 10/17/2023 00:10:46     MAX. SYSTOLIC BP 10/17/2023 220     Max Diastolic Bp 10/17/2023 90     Max Heart Rate 10/17/2023 155     Max Predicted Heart Rate 10/17/2023 171     Reason for Termination 10/17/2023 Dyspnea     Test Indication 10/17/2023 fam hx CAD, HTN     Target Hr Formular 10/17/2023 (220 - Age)*100%     Chest Pain Statement 10/17/2023 none    Office Visit on 09/26/2023   Component Date Value    Cholesterol, Total 09/28/2023 183     Triglycerides 09/28/2023 147     HDL 09/28/2023 38 (L)     VLDL Cholesterol Calcula* 09/28/2023 26     LDL Calculated 09/28/2023 119 (H)     T. Chol/HDL Ratio 09/28/2023 4.8     Glucose, Random 09/28/2023 105 (H)     BUN 09/28/2023 11     Creatinine 09/28/2023 0.95     eGFR 09/28/2023 99     SL AMB BUN/CREATININE RA* 09/28/2023 12     Sodium 09/28/2023 137     Potassium 09/28/2023 4.2     Chloride 09/28/2023 102     CO2 09/28/2023 23     CALCIUM 09/28/2023 8.9     Protein, Total 09/28/2023 7.0     Albumin 09/28/2023 4.5     Globulin, Total 09/28/2023 2.5     Albumin/Globulin Ratio 09/28/2023 1.8     TOTAL BILIRUBIN 09/28/2023 0.8     Alk Phos Isoenzymes 09/28/2023 106     AST 09/28/2023 32     ALT 09/28/2023 41     White Blood Cell Count 09/28/2023 7.6     Red Blood Cell Count 09/28/2023 4.62     Hemoglobin 09/28/2023 14.1     HCT 09/28/2023 41.8     MCV 09/28/2023 91     MCH 09/28/2023 30.5     MCHC 09/28/2023 33.7     RDW 09/28/2023 13.0     Platelet Count 09/28/2023 242     Neutrophils  09/28/2023 59     Lymphocytes 09/28/2023 25     Monocytes 09/28/2023 11     Eosinophils 09/28/2023 4     Basophils PCT 09/28/2023 1     Neutrophils (Absolute) 09/28/2023 4.5     Lymphocytes (Absolute) 09/28/2023 1.9     Monocytes (Absolute) 09/28/2023 0.8     Eosinophils (Absolute) 09/28/2023 0.3     Basophils ABS 09/28/2023 0.1     Immature Granulocytes 09/28/2023 0     Immature Granulocytes (A* 09/28/2023 0.0     TSH 09/28/2023 1.280     Specific Gravity 09/28/2023 1.026     Ph 09/28/2023 5.5     Color UA 09/28/2023 Yellow     Urine Appearance 09/28/2023 Clear     Leukocyte Esterase 09/28/2023 Negative     Protein 09/28/2023 Negative     Glucose, 24 HR Urine 09/28/2023 Negative     Ketone, Urine 09/28/2023 Negative     Blood, Urine 09/28/2023 Negative     Bilirubin, Urine 09/28/2023 Negative     Urobilinogen Urine 09/28/2023 0.2     Nitrites Urine 09/28/2023 Negative     Microscopic Examination 09/28/2023 Comment     Prostate Specific Antige* 09/28/2023 2.9     Reflex Criteria 09/28/2023 Comment     HEP C AB 09/28/2023 Non Reactive     HIV Screen 4th Generatio* 09/28/2023 Non Reactive      Imaging: No results found.    Review of Systems:  Review of Systems   Constitutional:  Negative for activity change, appetite change, diaphoresis, fatigue, fever and unexpected weight change.   HENT:  Negative for congestion, hearing loss, nosebleeds and trouble swallowing.    Respiratory:  Negative for apnea, cough, choking, chest tightness, shortness of breath, wheezing and stridor.    Cardiovascular:  Negative for chest pain, palpitations and leg swelling.   Gastrointestinal:  Negative for abdominal pain, anal bleeding, blood in stool, constipation, diarrhea, nausea and vomiting.   Endocrine: Negative for cold intolerance and heat intolerance.   Genitourinary:  Negative for difficulty urinating, frequency and hematuria.   Musculoskeletal:  Negative for arthralgias, gait problem and myalgias.   Skin:  Negative for pallor and  rash.   Allergic/Immunologic: Negative for immunocompromised state.   Neurological:  Negative for dizziness, tremors, syncope, facial asymmetry, speech difficulty, weakness, light-headedness and numbness.   Hematological:  Does not bruise/bleed easily.   Psychiatric/Behavioral:  Negative for sleep disturbance. The patient is not nervous/anxious.        Physical Exam:  Physical Exam  Vitals reviewed.   Constitutional:       General: He is not in acute distress.     Appearance: Normal appearance. He is obese. He is not ill-appearing, toxic-appearing or diaphoretic.   HENT:      Head: Normocephalic.   Eyes:      General: No scleral icterus.     Conjunctiva/sclera: Conjunctivae normal.   Neck:      Vascular: No carotid bruit.   Cardiovascular:      Rate and Rhythm: Normal rate and regular rhythm.      Pulses: Normal pulses.      Heart sounds: Normal heart sounds. No murmur heard.     No friction rub. No gallop.   Pulmonary:      Effort: Pulmonary effort is normal. No respiratory distress.      Breath sounds: Normal breath sounds. No stridor. No wheezing, rhonchi or rales.   Chest:      Chest wall: No tenderness.   Abdominal:      General: Bowel sounds are normal.      Palpations: Abdomen is soft.      Tenderness: There is no abdominal tenderness.   Musculoskeletal:      Right lower leg: No edema.      Left lower leg: No edema.   Skin:     General: Skin is warm and dry.      Capillary Refill: Capillary refill takes less than 2 seconds.      Coloration: Skin is not jaundiced or pale.      Findings: No bruising or erythema.   Neurological:      Mental Status: He is alert and oriented to person, place, and time.   Psychiatric:         Mood and Affect: Mood normal.         Behavior: Behavior normal.         Thought Content: Thought content normal.         Judgment: Judgment normal.         Discussion/Summary: Dyslipidemia, mildly abnormal coronary calcium score suggesting atherosclerosis, unremarkable examination and normal  EKG, and nondiagnostic negative stress test for ischemia.  At a high workload.  Preference will be to start him on lipid-lowering therapy specifically statin therapy.  Long-term benefits of this was explained to the patient.  Include reduction of myocardial infarction, CVA and need for revascularizations.  Will proceed with echocardiogram to look for wall motion abnormalities as well.  No history of bleeding diathesis, favor aspirin therapy for primary prevention.  Regular exercise and weight management recommended.    This note was completed in part utilizing [employer] direct voice recognition software.   Grammatical errors, random word insertion, spelling mistakes, and incomplete sentences may be an occasional consequence of the system secondary to software limitations, ambient noise and hardware issues. At the time of dictation, efforts were made to edit, clarify and /or correct errors.  Please read the chart carefully and recognize, using context, where substitutions have occurred.  If you have any questions or concerns about the context, text or information contained within the body of this dictation, please contact myself, the provider, for further clarification.

## 2024-02-29 ENCOUNTER — APPOINTMENT (OUTPATIENT)
Dept: PHYSICAL THERAPY | Facility: CLINIC | Age: 49
End: 2024-02-29
Payer: COMMERCIAL

## 2024-03-04 ENCOUNTER — HOSPITAL ENCOUNTER (OUTPATIENT)
Dept: NON INVASIVE DIAGNOSTICS | Facility: HOSPITAL | Age: 49
Discharge: HOME | End: 2024-03-04
Attending: INTERNAL MEDICINE
Payer: COMMERCIAL

## 2024-03-04 VITALS
HEIGHT: 71 IN | DIASTOLIC BLOOD PRESSURE: 82 MMHG | WEIGHT: 292 LBS | BODY MASS INDEX: 40.88 KG/M2 | HEART RATE: 72 BPM | SYSTOLIC BLOOD PRESSURE: 148 MMHG

## 2024-03-04 DIAGNOSIS — R93.1 AGATSTON CORONARY ARTERY CALCIUM SCORE LESS THAN 100: ICD-10-CM

## 2024-03-04 PROCEDURE — 93306 TTE W/DOPPLER COMPLETE: CPT | Performed by: INTERNAL MEDICINE

## 2024-03-04 PROCEDURE — 93356 MYOCRD STRAIN IMG SPCKL TRCK: CPT

## 2024-03-04 PROCEDURE — 93306 TTE W/DOPPLER COMPLETE: CPT

## 2024-03-04 PROCEDURE — 93356 MYOCRD STRAIN IMG SPCKL TRCK: CPT | Performed by: INTERNAL MEDICINE

## 2024-03-05 LAB
AORTIC ARCH: 3.3 CM
AORTIC ROOT: 3.5 CM
AORTIC VALVE MEAN VELOCITY: 10.6 M/S
APICAL FOUR CHAMBER EJECTION FRACTION: 64 %
AV LVOT MEAN GRADIENT: 3 MMHG
AV MEAN GRADIENT: 5 MMHG
BSA FOR ECHO PROCEDURE: 2.48 M2
DOP CALC AO VTI: 29.7 CM
DOP CALC LVOT PEAK VEL VTI: 21.6 CM
DOP CALC MV VTI: 21.5 CM
E WAVE DECELERATION TIME: 175 MS
E/A RATIO: 1.27
FRACTIONAL SHORTENING: 40 (ref 28–44)
INTERVENTRICULAR SEPTUM IN DIASTOLE (PARASTERNAL SHORT AXIS VIEW): 1.2 CM
INTERVENTRICULAR SEPTUM: 1.2 CM (ref 0.6–1.1)
LA/AORTA RATIO 2D: 1.09
LAAS-AP2: 18 CM2
LAAS-AP4: 18.4 CM2
LEFT ATRIUM SIZE: 3.8 CM
LEFT ATRIUM VOLUME (MOD BIPLANE): 50 ML
LEFT ATRIUM VOLUME INDEX (MOD BIPLANE): 20.2 ML/M2
LEFT INTERNAL DIMENSION IN SYSTOLE: 3 CM (ref 2.1–4)
LEFT VENTRICLE DIASTOLIC VOLUME (MOD BIPLANE): 149 ML
LEFT VENTRICLE DIASTOLIC VOLUME INDEX (MOD BIPLANE): 60.1 ML/M2
LEFT VENTRICLE SYSTOLIC VOLUME (MOD BIPLANE): 57 ML
LEFT VENTRICLE SYSTOLIC VOLUME INDEX (MOD BIPLANE): 23 ML/M2
LEFT VENTRICULAR INTERNAL DIMENSION IN DIASTOLE: 5 CM (ref 3.5–6)
LEFT VENTRICULAR POSTERIOR WALL IN END DIASTOLE: 1 CM
LEFT VENTRICULAR STROKE VOLUME: 81 ML
LV EF: 62 %
LVSV (TEICH): 81 ML
MV E'TISSUE VEL-LAT: 13 CM/S
MV E'TISSUE VEL-SEP: 12 CM/S
MV MEAN GRADIENT: 1 MMHG
MV PEAK A VEL: 0.63 M/S
MV PEAK E VEL: 80 CM/S
MV PEAK GRADIENT: 3 MMHG
MV STENOSIS PRESSURE HALF TIME: 51 MS
MV VALVE AREA P 1/2 METHOD: 4.31
RIGHT VENTRICLE ID DIMENSION: 3.6 CM
SL CV LV EF: 55
SL CV PED ECHO LEFT VENTRICLE DIASTOLIC VOLUME (MOD BIPLANE) 2D: 117 ML
SL CV PED ECHO LEFT VENTRICLE SYSTOLIC VOLUME (MOD BIPLANE) 2D: 36 ML
TRICUSPID ANNULAR PLANE SYSTOLIC EXCURSION: 2.7 CM

## 2024-03-11 ENCOUNTER — HOSPITAL ENCOUNTER (OUTPATIENT)
Dept: MRI IMAGING | Facility: HOSPITAL | Age: 49
Discharge: HOME | End: 2024-03-11
Attending: ORTHOPAEDIC SURGERY
Payer: COMMERCIAL

## 2024-03-11 DIAGNOSIS — M24.811 INTERNAL DERANGEMENT OF RIGHT SHOULDER: ICD-10-CM

## 2024-03-11 DIAGNOSIS — S46.911D STRAIN OF RIGHT SHOULDER, SUBSEQUENT ENCOUNTER: ICD-10-CM

## 2024-03-11 PROCEDURE — 73221 MRI JOINT UPR EXTREM W/O DYE: CPT

## 2024-03-21 DIAGNOSIS — R93.1 AGATSTON CORONARY ARTERY CALCIUM SCORE LESS THAN 100: ICD-10-CM

## 2024-03-22 RX ORDER — ATORVASTATIN CALCIUM 10 MG/1
10 TABLET, FILM COATED ORAL DAILY
Qty: 90 TABLET | Refills: 6 | Status: SHIPPED | OUTPATIENT
Start: 2024-03-22

## 2024-06-04 DIAGNOSIS — I10 PRIMARY HYPERTENSION: ICD-10-CM

## 2024-06-05 RX ORDER — LOSARTAN POTASSIUM 25 MG/1
25 TABLET ORAL DAILY
Qty: 90 TABLET | Refills: 1 | Status: SHIPPED | OUTPATIENT
Start: 2024-06-05 | End: 2024-07-05

## 2024-08-09 ENCOUNTER — OFFICE VISIT (OUTPATIENT)
Dept: BARIATRICS | Facility: CLINIC | Age: 49
End: 2024-08-09
Payer: COMMERCIAL

## 2024-08-09 VITALS
BODY MASS INDEX: 41.35 KG/M2 | DIASTOLIC BLOOD PRESSURE: 90 MMHG | SYSTOLIC BLOOD PRESSURE: 130 MMHG | HEART RATE: 73 BPM | HEIGHT: 70 IN | WEIGHT: 288.8 LBS

## 2024-08-09 DIAGNOSIS — E78.2 MIXED HYPERLIPIDEMIA: ICD-10-CM

## 2024-08-09 DIAGNOSIS — R73.01 IFG (IMPAIRED FASTING GLUCOSE): ICD-10-CM

## 2024-08-09 DIAGNOSIS — I10 PRIMARY HYPERTENSION: ICD-10-CM

## 2024-08-09 DIAGNOSIS — Z80.8 FAMILY HISTORY OF THYROID CANCER: ICD-10-CM

## 2024-08-09 DIAGNOSIS — E66.01 CLASS 3 SEVERE OBESITY DUE TO EXCESS CALORIES WITH SERIOUS COMORBIDITY AND BODY MASS INDEX (BMI) OF 40.0 TO 44.9 IN ADULT (HCC): Primary | ICD-10-CM

## 2024-08-09 DIAGNOSIS — E55.9 VITAMIN D DEFICIENCY: ICD-10-CM

## 2024-08-09 PROBLEM — E66.813 CLASS 3 SEVERE OBESITY DUE TO EXCESS CALORIES WITH SERIOUS COMORBIDITY AND BODY MASS INDEX (BMI) OF 40.0 TO 44.9 IN ADULT (HCC): Status: ACTIVE | Noted: 2024-08-09

## 2024-08-09 PROCEDURE — 99204 OFFICE O/P NEW MOD 45 MIN: CPT | Performed by: NURSE PRACTITIONER

## 2024-08-09 RX ORDER — CETIRIZINE HYDROCHLORIDE 5 MG/1
5 TABLET ORAL DAILY
COMMUNITY

## 2024-08-09 RX ORDER — MULTIVITAMIN
1 TABLET ORAL DAILY
COMMUNITY

## 2024-08-09 NOTE — ASSESSMENT & PLAN NOTE
Patient's mother with thyroid cancer 30 years ago -his mother is still living  He will attempt to obtain the type of thyroid cancer she suffered from prior to considering GLP-1 therapy

## 2024-08-09 NOTE — ASSESSMENT & PLAN NOTE
- Discussed options of HealthyCORE-Intensive Lifestyle Intervention Program, Very Low Calorie Diet-VLCD, and Conservative Program and the role of weight loss medications.  - Patient is interested in pursuing Conservative Program and follow up visits with medical weight management provider.  - Explained the importance of making lifestyle changes in addition to starting any anti-obesity medications.   - Initial weight loss goal of 5-10% weight loss for improved health. Weight loss can improve patient's co-morbid conditions and/or prevent weight-related complications.  - Weight is not at goal and patient has been unable to achieve a meaningful weight loss above 5% using various programs and tools for more than 6 months  - Labs reviewed from 9/2023 -labs previously ordered by PCP will be completed including lipids, CMP, A1c.  Will add a TSH, vitamin D, and CBC    General Recommendations:  Nutrition:  Eat breakfast daily.  Do not skip meals.      Food log (ie.) www.myfitnesspal.com, sparkpeople.com, loseit.com, calorieking.com, etc.     Practice mindful eating.  Be sure to set aside time to eat, eat slowly, and savor your food.     Hydration:    At least 64oz of water daily.  No sugar sweetened beverages.  No juice (eat the fruit instead).     Exercise:  Studies have shown that the ideal exercise goal is somewhere between 150 to 300 minutes of moderate intensity exercise a week.  Start with exercising 10 minutes every other day and gradually increase physical activity with a goal of at least 150 minutes of moderate intensity exercise a week, divided over at least 3 days a week.  An example of this would be exercising 30 minutes a day, 5 days a week.  Resistance training can increase muscle mass and increase our resting metabolic rate.   FULL BODY resistance training is recommended 2-3 times a week.  Do not do this on consecutive days to allow for muscle recovery.     Aim for a bare minimum 5000 steps, even on days you do  not exercise.     Monitoring:   Weigh yourself daily.  If this causes undue stress, then just weigh yourself once a week.  Weigh yourself the same time of the day with the same amount of clothing on.  Preferably this should be done after waking up, before you eat, and with no clothing or minimal clothing on.     Specific Goals:  Patient lifestyle habits were reviewed and barriers to weight loss were addressed today.  Nutrition was discussed and patient will be meeting with a dietitian to complete the body stats package.  He is going to focus on better structuring his nutrition and learning how to balance his calories more evenly during the day.  Suspect the patient is under eating based on his physical activity and needs to increase his protein intake.  Activity was discussed and patient was encouraged to continue with at least 2 to 3 days of activity doing both cardiovascular as well as strength training to maintain muscle mass.  Medications were discussed:  Phentermine discussed but would use cautiously as patient has elevated blood pressure  Topiramate was discussed and patient denies any history of kidney stones  Bupropion/naltrexone was discussed but patient denies any cravings or emotional eating  GLP-1 medications were discussed and patient was interested in starting an injectable medication.  He is going to review his mother's thyroid cancer history and see if he can determine what type of thyroid cancer she had 30 years ago.  If they are unable to determine will consider an ultrasound of the thyroid and thyroid function testing before starting a GLP-1 medication.  Patient is also going to obtain an updated panel of blood work prior to considering weight loss medication.    Currently patient does not use CPAP  -Sleep referral discussed and patient is going to consider  -Discussed risks of untreated sleep apnea such as sudden cardiac death by arrhythmia, uncontrolled hypertension, difficulty with weight loss,  decreased quality sleep, increased insulin resistance, and stroke.  -Sleep apnea often improves with dietary and lifestyle changes  -20-30% weight loss will also help with symptoms and complications of sleep apnea

## 2024-08-09 NOTE — ASSESSMENT & PLAN NOTE
Currently on losartan 25 mg daily  Blood pressure borderline today  Weight loss and diet changes will likely help with blood pressure control

## 2024-08-09 NOTE — PROGRESS NOTES
Assessment/Plan:    Class 3 severe obesity due to excess calories with serious comorbidity and body mass index (BMI) of 40.0 to 44.9 in adult (HCC)  - Discussed options of HealthyCORE-Intensive Lifestyle Intervention Program, Very Low Calorie Diet-VLCD, and Conservative Program and the role of weight loss medications.  - Patient is interested in pursuing Conservative Program and follow up visits with medical weight management provider.  - Explained the importance of making lifestyle changes in addition to starting any anti-obesity medications.   - Initial weight loss goal of 5-10% weight loss for improved health. Weight loss can improve patient's co-morbid conditions and/or prevent weight-related complications.  - Weight is not at goal and patient has been unable to achieve a meaningful weight loss above 5% using various programs and tools for more than 6 months  - Labs reviewed from 9/2023 -labs previously ordered by PCP will be completed including lipids, CMP, A1c.  Will add a TSH, vitamin D, and CBC    General Recommendations:  Nutrition:  Eat breakfast daily.  Do not skip meals.      Food log (ie.) www.myfitnesspal.com, sparkpeople.com, loseit.com, calorieking.com, etc.     Practice mindful eating.  Be sure to set aside time to eat, eat slowly, and savor your food.     Hydration:    At least 64oz of water daily.  No sugar sweetened beverages.  No juice (eat the fruit instead).     Exercise:  Studies have shown that the ideal exercise goal is somewhere between 150 to 300 minutes of moderate intensity exercise a week.  Start with exercising 10 minutes every other day and gradually increase physical activity with a goal of at least 150 minutes of moderate intensity exercise a week, divided over at least 3 days a week.  An example of this would be exercising 30 minutes a day, 5 days a week.  Resistance training can increase muscle mass and increase our resting metabolic rate.   FULL BODY resistance training is  recommended 2-3 times a week.  Do not do this on consecutive days to allow for muscle recovery.     Aim for a bare minimum 5000 steps, even on days you do not exercise.     Monitoring:   Weigh yourself daily.  If this causes undue stress, then just weigh yourself once a week.  Weigh yourself the same time of the day with the same amount of clothing on.  Preferably this should be done after waking up, before you eat, and with no clothing or minimal clothing on.     Specific Goals:  Patient lifestyle habits were reviewed and barriers to weight loss were addressed today.  Nutrition was discussed and patient will be meeting with a dietitian to complete the body stats package.  He is going to focus on better structuring his nutrition and learning how to balance his calories more evenly during the day.  Suspect the patient is under eating based on his physical activity and needs to increase his protein intake.  Activity was discussed and patient was encouraged to continue with at least 2 to 3 days of activity doing both cardiovascular as well as strength training to maintain muscle mass.  Medications were discussed:  Phentermine discussed but would use cautiously as patient has elevated blood pressure  Topiramate was discussed and patient denies any history of kidney stones  Bupropion/naltrexone was discussed but patient denies any cravings or emotional eating  GLP-1 medications were discussed and patient was interested in starting an injectable medication.  He is going to review his mother's thyroid cancer history and see if he can determine what type of thyroid cancer she had 30 years ago.  If they are unable to determine will consider an ultrasound of the thyroid and thyroid function testing before starting a GLP-1 medication.  Patient is also going to obtain an updated panel of blood work prior to considering weight loss medication.    Currently patient does not use CPAP  -Sleep referral discussed and patient is going  to consider  -Discussed risks of untreated sleep apnea such as sudden cardiac death by arrhythmia, uncontrolled hypertension, difficulty with weight loss, decreased quality sleep, increased insulin resistance, and stroke.  -Sleep apnea often improves with dietary and lifestyle changes  -20-30% weight loss will also help with symptoms and complications of sleep apnea      Mixed hyperlipidemia  Currently on atorvastatin 10 mg  Weight loss and diet changes may improve cholesterol levels    Primary hypertension  Currently on losartan 25 mg daily  Blood pressure borderline today  Weight loss and diet changes will likely help with blood pressure control    IFG (impaired fasting glucose)  Fasting blood sugar when last checked was 105  A1c already ordered by PCP    Family history of thyroid cancer  Patient's mother with thyroid cancer 30 years ago -his mother is still living  He will attempt to obtain the type of thyroid cancer she suffered from prior to considering GLP-1 therapy         Dixon was seen today for consult.    Diagnoses and all orders for this visit:    Class 3 severe obesity due to excess calories with serious comorbidity and body mass index (BMI) of 40.0 to 44.9 in adult (HCC)  -     TSH, 3rd generation with Free T4 reflex; Future  -     CBC and differential; Future  -     Vitamin D 25 hydroxy; Future    Vitamin D deficiency  -     Vitamin D 25 hydroxy; Future    Mixed hyperlipidemia    Primary hypertension    IFG (impaired fasting glucose)    Family history of thyroid cancer         Patient denies personal history of pancreatitis. Patient also denies personal and family history of multiple endocrine neoplasia type 2 (MEN 2 tumor). Patient denies any history of kidney stones, seizures, or glaucoma.   Patient's mother did have thyroid cancer -unclear as to what kind.    Total time spent reviewing chart, interviewing patient, examining patient, discussing plan, answering all questions, and documentin min,  "with >50% face-to-face time spent counseling patient on nonsurgical interventions for the treatment of excess weight. Discussed in detail nonsurgical options including intensive lifestyle intervention program, very low-calorie diet program and conservative program.  Discussed the role of weight loss medications.  Counseled patient on diet behavior and exercise modification for weight loss.    Follow up in approximately 2 months with Non-Surgical Physician/Advanced Practitioner.    Subjective:   Chief Complaint   Patient presents with    Consult     Pt is here for MWM consult. Waist - 50\"       Patient ID: Dixon Schaeffer  is a 48 y.o. male with excess weight/obesity here to pursue weight management.  Previous notes and records have been reviewed.    Past Medical History:   Diagnosis Date    Allergic     Seasonal    Heart murmur      History reviewed. No pertinent surgical history.    HPI:  Wt Readings from Last 20 Encounters:   08/09/24 131 kg (288 lb 12.8 oz)   03/04/24 132 kg (292 lb)   02/28/24 133 kg (292 lb 6.4 oz)   02/22/24 131 kg (289 lb)   02/08/24 132 kg (290 lb)   01/16/24 132 kg (290 lb)   11/03/23 130 kg (286 lb 3.2 oz)   09/26/23 129 kg (284 lb 6.4 oz)       Patient presents today to medical weight management office for consult.  Patient has been struggling with his weight for about the past 10 years.  He works as a  and is active during most of his day which often takes most of his energy away for working out later in the evening.  He has been trying to be more conscious of this and is going down to his home gym for 2 to 3 days during the week doing both aerobic and some lifting.  He has noted some increased joint pain with his weight gain, specifically in his hips.  He and his wife have tried different programs in the past including using the lose it armando and doing an isogenic's program.  Patient is concerned about his health as he now has blood pressure and cholesterol concerns.  He " recently saw a cardiologist.      Obesity/Excess Weight:  Severity: Severe  Onset:  last 10 years    Modifiers: Diet and Exercise and Commercial Weight Loss Programs-ie. Weight Watchers, Littlecast, Nutrisystem, etc.  Contributing factors: Poor Food Choices, Insufficient Physical Activity, and Insufficient time to make appropriate lifestyle changes  Associated symptoms: comorbid conditions, increased joint pain, decreased exercise capacity, increased shortness of breath, decreased mobility, and inability to do certain activities    Diet recall:  B: protein yogurt and granola  L: PB&J  S: sometimes M&M  D: chicken with vegetables and starch  S: popcorn    Hydration: water - 80 oz, SF gatorade   Alcohol: very rare  Smoking: no  Exercise: 2-3 days  Occupation: contractor  Sleep: well  STOP ban/8    Current weight: 288.8 lbs  Current BMI: 41.44  Current Waist Measurement: 50 in  Goal weight: 200 lbs    Colonoscopy:     The following portions of the patient's history were reviewed and updated as appropriate: allergies, current medications, past family history, past medical history, past social history, past surgical history, and problem list.    Family History   Problem Relation Age of Onset    Hypertension Mother     Thyroid disease Mother     Cancer Mother         Thyroid    Hypertension Father     Heart disease Father     Breast cancer Father         Review of Systems   Constitutional:  Negative for fatigue.   HENT:  Negative for sore throat.    Respiratory:  Negative for cough and shortness of breath.    Cardiovascular:  Negative for chest pain, palpitations and leg swelling.   Gastrointestinal:  Negative for abdominal pain, constipation, diarrhea and nausea.   Genitourinary:  Negative for dysuria.   Musculoskeletal:  Positive for arthralgias. Negative for back pain.   Skin:  Negative for rash.   Neurological:  Negative for headaches.   Psychiatric/Behavioral:  Negative for dysphoric mood. The patient is not  "nervous/anxious.        Objective:  /90   Pulse 73   Ht 5' 10\" (1.778 m)   Wt 131 kg (288 lb 12.8 oz)   BMI 41.44 kg/m²     Physical Exam  Vitals and nursing note reviewed.   Constitutional:       Appearance: Normal appearance. He is obese.   HENT:      Head: Normocephalic.   Pulmonary:      Effort: Pulmonary effort is normal.   Neurological:      General: No focal deficit present.      Mental Status: He is alert and oriented to person, place, and time.   Psychiatric:         Mood and Affect: Mood normal.         Behavior: Behavior normal.         Thought Content: Thought content normal.         Judgment: Judgment normal.              Labs and Imaging  Recent labs and imaging have been personally reviewed.  Lab Results   Component Value Date    WBC 7.6 09/28/2023    HGB 14.1 09/28/2023    HCT 41.8 09/28/2023    MCV 91 09/28/2023     09/28/2023     Lab Results   Component Value Date    SODIUM 137 09/28/2023    K 4.2 09/28/2023     09/28/2023    CO2 23 09/28/2023    BUN 11 09/28/2023    CREATININE 0.95 09/28/2023    GLUC 105 (H) 09/28/2023    AST 32 09/28/2023    ALT 41 09/28/2023    TP 7.0 09/28/2023    TBILI 0.8 09/28/2023    EGFR 99 09/28/2023     No results found for: \"HGBA1C\"  Lab Results   Component Value Date    TSH 1.280 09/28/2023     Lab Results   Component Value Date    CHOLESTEROL 183 09/28/2023     Lab Results   Component Value Date    HDL 38 (L) 09/28/2023     Lab Results   Component Value Date    TRIG 147 09/28/2023     Lab Results   Component Value Date    LDLCALC 119 (H) 09/28/2023     "

## 2024-08-14 ENCOUNTER — APPOINTMENT (OUTPATIENT)
Dept: LAB | Facility: CLINIC | Age: 49
End: 2024-08-14
Payer: COMMERCIAL

## 2024-08-14 DIAGNOSIS — E55.9 VITAMIN D DEFICIENCY: ICD-10-CM

## 2024-08-14 DIAGNOSIS — R73.01 IMPAIRED FASTING GLUCOSE: ICD-10-CM

## 2024-08-14 DIAGNOSIS — R93.1 AGATSTON CORONARY ARTERY CALCIUM SCORE LESS THAN 100: ICD-10-CM

## 2024-08-14 DIAGNOSIS — E66.01 CLASS 3 SEVERE OBESITY DUE TO EXCESS CALORIES WITH SERIOUS COMORBIDITY AND BODY MASS INDEX (BMI) OF 40.0 TO 44.9 IN ADULT (HCC): ICD-10-CM

## 2024-08-14 DIAGNOSIS — E78.2 MIXED HYPERLIPIDEMIA: ICD-10-CM

## 2024-08-14 LAB
25(OH)D3 SERPL-MCNC: 44.7 NG/ML (ref 30–100)
ALBUMIN SERPL BCG-MCNC: 4.2 G/DL (ref 3.5–5)
ALP SERPL-CCNC: 97 U/L (ref 34–104)
ALT SERPL W P-5'-P-CCNC: 32 U/L (ref 7–52)
ANION GAP SERPL CALCULATED.3IONS-SCNC: 7 MMOL/L (ref 4–13)
AST SERPL W P-5'-P-CCNC: 23 U/L (ref 13–39)
BASOPHILS # BLD AUTO: 0.04 THOUSANDS/ÂΜL (ref 0–0.1)
BASOPHILS NFR BLD AUTO: 1 % (ref 0–1)
BILIRUB SERPL-MCNC: 0.69 MG/DL (ref 0.2–1)
BUN SERPL-MCNC: 13 MG/DL (ref 5–25)
CALCIUM SERPL-MCNC: 9.3 MG/DL (ref 8.4–10.2)
CHLORIDE SERPL-SCNC: 105 MMOL/L (ref 96–108)
CHOLEST SERPL-MCNC: 142 MG/DL
CO2 SERPL-SCNC: 27 MMOL/L (ref 21–32)
CREAT SERPL-MCNC: 0.88 MG/DL (ref 0.6–1.3)
EOSINOPHIL # BLD AUTO: 0.31 THOUSAND/ÂΜL (ref 0–0.61)
EOSINOPHIL NFR BLD AUTO: 4 % (ref 0–6)
ERYTHROCYTE [DISTWIDTH] IN BLOOD BY AUTOMATED COUNT: 13.2 % (ref 11.6–15.1)
EST. AVERAGE GLUCOSE BLD GHB EST-MCNC: 114 MG/DL
GFR SERPL CREATININE-BSD FRML MDRD: 100 ML/MIN/1.73SQ M
GLUCOSE P FAST SERPL-MCNC: 95 MG/DL (ref 65–99)
HBA1C MFR BLD: 5.6 %
HCT VFR BLD AUTO: 43.1 % (ref 36.5–49.3)
HDLC SERPL-MCNC: 41 MG/DL
HGB BLD-MCNC: 14.4 G/DL (ref 12–17)
IMM GRANULOCYTES # BLD AUTO: 0.02 THOUSAND/UL (ref 0–0.2)
IMM GRANULOCYTES NFR BLD AUTO: 0 % (ref 0–2)
LDLC SERPL CALC-MCNC: 80 MG/DL (ref 0–100)
LYMPHOCYTES # BLD AUTO: 2.02 THOUSANDS/ÂΜL (ref 0.6–4.47)
LYMPHOCYTES NFR BLD AUTO: 27 % (ref 14–44)
MCH RBC QN AUTO: 30.3 PG (ref 26.8–34.3)
MCHC RBC AUTO-ENTMCNC: 33.4 G/DL (ref 31.4–37.4)
MCV RBC AUTO: 91 FL (ref 82–98)
MONOCYTES # BLD AUTO: 0.69 THOUSAND/ÂΜL (ref 0.17–1.22)
MONOCYTES NFR BLD AUTO: 9 % (ref 4–12)
NEUTROPHILS # BLD AUTO: 4.39 THOUSANDS/ÂΜL (ref 1.85–7.62)
NEUTS SEG NFR BLD AUTO: 59 % (ref 43–75)
NRBC BLD AUTO-RTO: 0 /100 WBCS
PLATELET # BLD AUTO: 243 THOUSANDS/UL (ref 149–390)
PMV BLD AUTO: 10.8 FL (ref 8.9–12.7)
POTASSIUM SERPL-SCNC: 4.4 MMOL/L (ref 3.5–5.3)
PROT SERPL-MCNC: 7 G/DL (ref 6.4–8.4)
RBC # BLD AUTO: 4.75 MILLION/UL (ref 3.88–5.62)
SODIUM SERPL-SCNC: 139 MMOL/L (ref 135–147)
TRIGL SERPL-MCNC: 105 MG/DL
TSH SERPL DL<=0.05 MIU/L-ACNC: 1.27 UIU/ML (ref 0.45–4.5)
WBC # BLD AUTO: 7.47 THOUSAND/UL (ref 4.31–10.16)

## 2024-08-14 PROCEDURE — 80061 LIPID PANEL: CPT

## 2024-08-14 PROCEDURE — 36415 COLL VENOUS BLD VENIPUNCTURE: CPT

## 2024-08-14 PROCEDURE — 80053 COMPREHEN METABOLIC PANEL: CPT

## 2024-08-14 PROCEDURE — 83036 HEMOGLOBIN GLYCOSYLATED A1C: CPT

## 2024-08-14 PROCEDURE — 84443 ASSAY THYROID STIM HORMONE: CPT

## 2024-08-14 PROCEDURE — 85025 COMPLETE CBC W/AUTO DIFF WBC: CPT

## 2024-08-14 PROCEDURE — 82306 VITAMIN D 25 HYDROXY: CPT

## 2024-09-18 ENCOUNTER — CLINICAL SUPPORT (OUTPATIENT)
Dept: BARIATRICS | Facility: CLINIC | Age: 49
End: 2024-09-18

## 2024-09-18 VITALS — BODY MASS INDEX: 41.35 KG/M2 | HEIGHT: 70 IN | WEIGHT: 288.8 LBS

## 2024-09-18 DIAGNOSIS — R63.5 ABNORMAL WEIGHT GAIN: Primary | ICD-10-CM

## 2024-09-18 PROCEDURE — RECHECK

## 2024-09-18 PROCEDURE — BODSTAT PR BODY STAT

## 2024-11-25 ENCOUNTER — OFFICE VISIT (OUTPATIENT)
Dept: FAMILY MEDICINE CLINIC | Facility: CLINIC | Age: 49
End: 2024-11-25
Payer: COMMERCIAL

## 2024-11-25 VITALS
OXYGEN SATURATION: 97 % | RESPIRATION RATE: 20 BRPM | BODY MASS INDEX: 41.78 KG/M2 | SYSTOLIC BLOOD PRESSURE: 138 MMHG | TEMPERATURE: 98.3 F | WEIGHT: 291.8 LBS | HEART RATE: 72 BPM | HEIGHT: 70 IN | DIASTOLIC BLOOD PRESSURE: 90 MMHG

## 2024-11-25 DIAGNOSIS — N64.4 MASTODYNIA OF RIGHT BREAST: Primary | ICD-10-CM

## 2024-11-25 PROCEDURE — 99213 OFFICE O/P EST LOW 20 MIN: CPT | Performed by: STUDENT IN AN ORGANIZED HEALTH CARE EDUCATION/TRAINING PROGRAM

## 2024-11-25 NOTE — PROGRESS NOTES
Name: Dixon Schaeffer      : 1975      MRN: [id number]  Encounter Provider: Kelsi Willis MD  Encounter Date: 2024   Encounter department: [facility]  :  Assessment & Plan  Mastodynia of right breast  Right lateral breast mass noted on exam (tender) w/o overlying skin changes. FH of breast cancer, pending diagnostic mammogram and US    Return precautions discussed   Orders:    Mammo diagnostic right w 3d and cad; Future    US breast right limited (diagnostic); Future           History of Present Illness     48 yo M w/ ty of HTN, IFG and HLD presenting for right breast pain w/ palpable mass noted last night. Father with ty of breast. Pt reports right breast pain for past month. Noted increase sensitivity last night. Noted just lateral to nipple a tender mass. Denies any constitutional sx. Denies any overlying skin changes, denies any erythema. Denies any nipple discharge or bleeding       Review of Systems   Constitutional:  Negative for chills, fever and unexpected weight change.   HENT:  Negative for trouble swallowing.    Eyes:  Negative for visual disturbance.   Respiratory:  Negative for cough and shortness of breath.    Cardiovascular:  Negative for chest pain and palpitations.   Gastrointestinal:  Negative for abdominal pain.   Genitourinary:  Negative for difficulty urinating.   Musculoskeletal:  Negative for arthralgias and back pain.   Skin:  Negative for color change and rash.   Neurological:  Negative for dizziness, syncope and light-headedness.   All other systems reviewed and are negative.    Medical History Reviewed by provider this encounter:  Tobacco  Allergies  Meds  Problems  Med Hx  Surg Hx  Fam Hx     .  Current Outpatient Medications on File Prior to Visit   Medication Sig Dispense Refill    atorvastatin (LIPITOR) 10 mg tablet TAKE 1 TABLET BY MOUTH EVERY DAY 90 tablet 6    cetirizine (ZyrTEC) 5 MG tablet Take 5 mg by mouth daily      losartan  "(COZAAR) 25 mg tablet TAKE 1 TABLET (25 MG TOTAL) BY MOUTH DAILY. 90 tablet 1    Multiple Vitamin (multivitamin) tablet Take 1 tablet by mouth daily       No current facility-administered medications on file prior to visit.      Social History     Tobacco Use    Smoking status: Former     Current packs/day: 0.00     Average packs/day: 0.5 packs/day for 10.4 years (5.2 ttl pk-yrs)     Types: Cigarettes     Start date: 2003     Quit date: 2013     Years since quittin.5     Passive exposure: Never    Smokeless tobacco: Never   Vaping Use    Vaping status: Never Used   Substance and Sexual Activity    Alcohol use: Yes     Alcohol/week: 2.0 standard drinks of alcohol     Types: 2 Cans of beer per week     Comment: Per week    Drug use: Never    Sexual activity: Yes     Partners: Female     Birth control/protection: Diaphragm, Male Sterilization        Objective   /90 (BP Location: Left arm, Patient Position: Sitting, Cuff Size: Large)   Pulse 72   Temp 98.3 °F (36.8 °C) (Tympanic)   Resp 20   Ht 5' 10\" (1.778 m)   Wt 132 kg (291 lb 12.8 oz)   SpO2 97%   BMI 41.87 kg/m²      Physical Exam  Vitals and nursing note reviewed.   Constitutional:       General: He is not in acute distress.     Appearance: He is well-developed.   HENT:      Head: Normocephalic and atraumatic.   Eyes:      Conjunctiva/sclera: Conjunctivae normal.   Cardiovascular:      Rate and Rhythm: Normal rate and regular rhythm.      Pulses: Normal pulses.      Heart sounds: Normal heart sounds. No murmur heard.  Pulmonary:      Effort: Pulmonary effort is normal. No respiratory distress.      Breath sounds: Normal breath sounds. No wheezing or rales.   Chest:   Breasts:     Right: Mass present. No swelling, bleeding, inverted nipple, nipple discharge, skin change or tenderness.      Left: Normal. No swelling, bleeding, inverted nipple, mass, nipple discharge, skin change or tenderness.          Comments: B/l symmetric gynecomastia " noted; right nipple noted tender subcutaneous lesion- no overlying skin changes   Abdominal:      General: Abdomen is flat. Bowel sounds are normal.      Palpations: Abdomen is soft.      Tenderness: There is no abdominal tenderness.   Musculoskeletal:         General: No swelling. Normal range of motion.      Cervical back: Normal range of motion and neck supple.   Lymphadenopathy:      Upper Body:      Right upper body: No supraclavicular, axillary or pectoral adenopathy.      Left upper body: No supraclavicular, axillary or pectoral adenopathy.   Skin:     General: Skin is warm and dry.      Capillary Refill: Capillary refill takes less than 2 seconds.   Neurological:      Mental Status: He is alert.   Psychiatric:         Mood and Affect: Mood normal.       Administrative Statements   I have spent a total time of 20 minutes in caring for this patient on the day of the visit/encounter including Prognosis, Risks and benefits of tx options, Instructions for management, Patient and family education, Importance of tx compliance, Impressions, Counseling / Coordination of care, Documenting in the medical record, Reviewing / ordering tests, medicine, procedures  , and Obtaining or reviewing history  .

## 2024-12-15 DIAGNOSIS — I10 PRIMARY HYPERTENSION: ICD-10-CM

## 2024-12-16 ENCOUNTER — HOSPITAL ENCOUNTER (OUTPATIENT)
Dept: MAMMOGRAPHY | Facility: CLINIC | Age: 49
Discharge: HOME | End: 2024-12-16
Payer: COMMERCIAL

## 2024-12-16 ENCOUNTER — RESULTS FOLLOW-UP (OUTPATIENT)
Dept: FAMILY MEDICINE CLINIC | Facility: CLINIC | Age: 49
End: 2024-12-16

## 2024-12-16 ENCOUNTER — HOSPITAL ENCOUNTER (OUTPATIENT)
Dept: ULTRASOUND IMAGING | Facility: CLINIC | Age: 49
Discharge: HOME | End: 2024-12-16
Payer: COMMERCIAL

## 2024-12-16 VITALS — HEIGHT: 70 IN | BODY MASS INDEX: 40.8 KG/M2 | WEIGHT: 285 LBS

## 2024-12-16 DIAGNOSIS — N64.4 MASTODYNIA OF RIGHT BREAST: ICD-10-CM

## 2024-12-16 PROCEDURE — 77066 DX MAMMO INCL CAD BI: CPT

## 2024-12-16 PROCEDURE — G0279 TOMOSYNTHESIS, MAMMO: HCPCS

## 2024-12-16 PROCEDURE — 76642 ULTRASOUND BREAST LIMITED: CPT

## 2024-12-16 NOTE — PROGRESS NOTES
Met with patient and Dr. Salinas  regarding recommendation for;      __x___ RIGHT ______LEFT      __x___Ultrasound guided  ______Stereotactic  Breast biopsy.      ___x__Verbalized understanding.      Blood thinners:  _____yes __x___no    Date stopped: _____x______    Biopsy teaching sheet given:  _____x__yes ______no

## 2024-12-17 RX ORDER — LOSARTAN POTASSIUM 25 MG/1
25 TABLET ORAL DAILY
Qty: 90 TABLET | Refills: 1 | Status: SHIPPED | OUTPATIENT
Start: 2024-12-17 | End: 2025-01-16

## 2025-01-07 ENCOUNTER — HOSPITAL ENCOUNTER (OUTPATIENT)
Dept: ULTRASOUND IMAGING | Facility: CLINIC | Age: 50
Discharge: HOME | End: 2025-01-07
Payer: COMMERCIAL

## 2025-01-07 ENCOUNTER — HOSPITAL ENCOUNTER (OUTPATIENT)
Dept: MAMMOGRAPHY | Facility: CLINIC | Age: 50
Discharge: HOME | End: 2025-01-07
Payer: COMMERCIAL

## 2025-01-07 VITALS — DIASTOLIC BLOOD PRESSURE: 94 MMHG | SYSTOLIC BLOOD PRESSURE: 136 MMHG | HEART RATE: 72 BPM

## 2025-01-07 DIAGNOSIS — Z98.890 STATUS POST BREAST BIOPSY: ICD-10-CM

## 2025-01-07 DIAGNOSIS — R92.8 ABNORMAL ULTRASOUND OF BREAST: ICD-10-CM

## 2025-01-07 PROCEDURE — 88342 IMHCHEM/IMCYTCHM 1ST ANTB: CPT | Performed by: PATHOLOGY

## 2025-01-07 PROCEDURE — 19083 BX BREAST 1ST LESION US IMAG: CPT

## 2025-01-07 PROCEDURE — 88305 TISSUE EXAM BY PATHOLOGIST: CPT | Performed by: PATHOLOGY

## 2025-01-07 PROCEDURE — A4648 IMPLANTABLE TISSUE MARKER: HCPCS

## 2025-01-07 RX ORDER — LIDOCAINE HYDROCHLORIDE 10 MG/ML
5 INJECTION, SOLUTION EPIDURAL; INFILTRATION; INTRACAUDAL; PERINEURAL ONCE
Status: COMPLETED | OUTPATIENT
Start: 2025-01-07 | End: 2025-01-07

## 2025-01-07 RX ADMIN — LIDOCAINE HYDROCHLORIDE 5 ML: 10 INJECTION, SOLUTION EPIDURAL; INFILTRATION; INTRACAUDAL; PERINEURAL at 11:09

## 2025-01-07 NOTE — PROGRESS NOTES
Procedure type:    ___x__ultrasound guided _____stereotactic    Breast:    _____Left ___x__Right    Location: 10:00 2cmfn    Needle: 14g Steph    # of passes: 4    Clip: butterfly    Performed by: Dr. Lomas    Pressure held for 5 minutes by: Mari Tsang Strips:    __x___yes _____no    Band aid:    ___x__yes_____no    Tape and guaze:    _____yes __x___no    Tolerated procedure:    ___x__yes _____no

## 2025-01-08 ENCOUNTER — RESULTS FOLLOW-UP (OUTPATIENT)
Dept: FAMILY MEDICINE CLINIC | Facility: CLINIC | Age: 50
End: 2025-01-08

## 2025-01-08 ENCOUNTER — TELEPHONE (OUTPATIENT)
Dept: MAMMOGRAPHY | Facility: CLINIC | Age: 50
End: 2025-01-08

## 2025-01-08 NOTE — PROGRESS NOTES
Post procedure call completed on 1/8/25 @ 0905    Bleeding: _____yes __x___no    Pain: _____yes ___x___no    Redness/Swelling: ______yes __x____no    Band aid removed: __x___yes _____no    Steri-Strips intact: ___x___yes _____no

## 2025-01-13 ENCOUNTER — TELEPHONE (OUTPATIENT)
Age: 50
End: 2025-01-13

## 2025-01-13 DIAGNOSIS — N64.4 MASTODYNIA OF RIGHT BREAST: Primary | ICD-10-CM

## 2025-01-13 NOTE — PROGRESS NOTES
Called pt and discussed; ongoing right breast discomfort w/ biopsy noting  fibroadipose tissue and fat necrosis, GS referral placed. All questions and concerns addressed

## 2025-01-13 NOTE — TELEPHONE ENCOUNTER
Patient called in to schedule an appointment regarding referral on file. Patient has a referral for Mastodynia of right breast. Please call the patient to schedule accordingly with Dr. Baxter. He would prefer to see her in [place].

## 2025-01-30 ENCOUNTER — OFFICE VISIT (OUTPATIENT)
Dept: SURGERY | Facility: CLINIC | Age: 50
End: 2025-01-30
Payer: COMMERCIAL

## 2025-01-30 ENCOUNTER — TELEPHONE (OUTPATIENT)
Age: 50
End: 2025-01-30

## 2025-01-30 VITALS
WEIGHT: 295 LBS | HEART RATE: 70 BPM | BODY MASS INDEX: 42.23 KG/M2 | DIASTOLIC BLOOD PRESSURE: 80 MMHG | TEMPERATURE: 97.7 F | SYSTOLIC BLOOD PRESSURE: 110 MMHG | OXYGEN SATURATION: 98 % | HEIGHT: 70 IN

## 2025-01-30 DIAGNOSIS — Z12.83 SKIN CANCER SCREENING: ICD-10-CM

## 2025-01-30 DIAGNOSIS — Z80.8 FAMILY HISTORY OF THYROID CANCER: ICD-10-CM

## 2025-01-30 DIAGNOSIS — Z80.0 FAMILY HISTORY OF COLON CANCER: ICD-10-CM

## 2025-01-30 DIAGNOSIS — N62 GYNECOMASTIA, MALE: ICD-10-CM

## 2025-01-30 DIAGNOSIS — Z80.3 FAMILY HISTORY OF BREAST CANCER IN MALE: ICD-10-CM

## 2025-01-30 DIAGNOSIS — N64.1 FAT NECROSIS (SEGMENTAL) OF BREAST: Primary | ICD-10-CM

## 2025-01-30 DIAGNOSIS — N64.4 MASTODYNIA OF RIGHT BREAST: ICD-10-CM

## 2025-01-30 PROCEDURE — 99205 OFFICE O/P NEW HI 60 MIN: CPT | Performed by: SURGERY

## 2025-01-30 NOTE — TELEPHONE ENCOUNTER
Rec'd call from patient requesting to schedule CONSULTATION with DR. ENRIQUE for GYNECOMASTIA, MALE.    Would be a joint case with Dr. Baxter.    I informed patient that Alanna, patient care coordinator, would return his call to get him scheduled.    Patient verbalized understanding.    Verified health coverage, e-mail and telephone number of patient.

## 2025-02-01 PROBLEM — N64.4 MASTODYNIA OF RIGHT BREAST: Status: ACTIVE | Noted: 2025-02-01

## 2025-02-01 PROBLEM — N64.1 FAT NECROSIS (SEGMENTAL) OF BREAST: Status: ACTIVE | Noted: 2025-02-01

## 2025-02-01 PROBLEM — N62 GYNECOMASTIA, MALE: Status: ACTIVE | Noted: 2025-02-01

## 2025-02-01 PROBLEM — Z12.83 SKIN CANCER SCREENING: Status: ACTIVE | Noted: 2025-02-01

## 2025-02-02 NOTE — PROGRESS NOTES
Consult - Surgical Oncology  Dixon Schaeffer 49 y.o. male MRN: [id number]  Encounter: [id number]    Assessment & Plan     49M with strong family history of malignancy including male breast cancer in his father (20's), bilateral gynecomastia, R>L, 6 months of new onset R breast pain, 1 month of a palpable right central breast mass at site of pain, biopsy proven fat necrosis, benign and concordant, standard clip in place. Site remains palpable and symptomatic.    We discussed the diagnosis at length and the usual preceding surgery or trauma that can lead to fat necrosis although in his case, he has had neither. We can proceed with a right breast surgical excisional biopsy to excise the mass with arlyn localization for diagnostic confirmation and therapeutic benefit. We can also follow it up in 6 months with a diagnostic ultrasound if he prefers observation, to ensure that it does not evolve and that the fat necrosis is not undersampling of a more serious lesion in light of his family history.    He is also interested in learning about surgical options to manage his gynecomastia as he has a close friend who had a great outcome after surgical intervention. We will coordinate a plastic surgery consultation so that he can discuss the details of the procedure, recovery, financial aspect. I explained that we can combine a resection procedure of the mass and a procedure to address his gynecomastia if he desires.     He has agreed to meet with genetics and obtain genetic testing as well in light of his family history. He understands that the results could lead to different treatment and screening options for him and his family. Referral placed.     Next colonoscopy due Fall 2025, no prior skin checks, agrees to establish care for skin cancer screening, dermatology referral placed.     We will follow up with him after his genetic testing and plastic surgery consultation to determine our next steps - either surgical planning,  arlyn placement, or coordination of a surveillance imaging study and visit plan.       History of Present Illness   Chief Complaint   Patient presents with    Breast Onc Consult     Patient is here today for pressure pain on the right side of the right breast that he was dealing with for 6 months before seeing his pcp in the ending of November. Pcp sent him for imaging in [facility] but before having imaging he found a lump on the rt side of the right breast. Pt denies nipple discharge, breast swelling, redness/irritation and denies any issues on the left breast. Pt denies any blood thinners and has allergies to penicillin.     Med/Family med h/o      For his personal med h/o he denies any cancer diagnosis. For Family h/o: Dad had breast cancer, tx was surgery no chemo or radiation was needed. His mother had thyroid cancer; tx was surgery and radiation. Both parents are living. Maternal grandfather had colon cancer but he didn't die from colon cancer he  from old age. Patient hasn't done any genetic testing. FYI he's a .      6 months of right breast pain, central and outer. 1-2 months ago he felt a lump there. Urgent diagnostic testing arranged, 10 x 5 x 9mm mass at 10 oclock, 2cm from the nipple in the right upper outer breast, otherwise negative. Biopsy showed fat necrosis and benign disease, felt to be concordant, breast radiology felt the whole process could be a resolving hematoma but the patient declined trauma. He is active as a contractor and has a large dog that jumps on his chest in the morning regularly but no known bruise, injury, trauma moment that he can recall. Standard clip in place.     He does have some bilateral breast sensitivity and known gynecomastia which was confirmed on his imaging as well, right more prominent than left. Friend had a great outcome with surgical management of his gynecomastia. He is interested in his options but appropriately concerned regarding  insurance coverage for that type of intervention.    Had prior colonoscopy at 44, due again fall . No prior derm skin checks.    Family history - thyroid cancer in mother at age 40 with recurrence, she is still living, she had total thyroid, COLE x 2, father with breast cancer in his 20's, had surgical management, still living at 78, colon cancer in maternal grandfather in 70's, maternal great aunt colon cancer in older years        Review of Systems   Constitutional: Negative.  Negative for unexpected weight change.   Skin:         Right breast pain    Right breast mass   Hematological: Negative.    All other systems reviewed and are negative.      Historical Information   Past Medical History:   Diagnosis Date    Allergic     Seasonal    Heart murmur      Past Surgical History:   Procedure Laterality Date    US GUIDED BREAST BIOPSY RIGHT COMPLETE Right 2025     Social History   Social History     Substance and Sexual Activity   Alcohol Use Yes    Alcohol/week: 2.0 standard drinks of alcohol    Types: 2 Cans of beer per week    Comment: Per week     Social History     Substance and Sexual Activity   Drug Use Never     Social History     Tobacco Use   Smoking Status Former    Current packs/day: 0.00    Average packs/day: 0.5 packs/day for 10.4 years (5.2 ttl pk-yrs)    Types: Cigarettes    Start date: 2003    Quit date: 2013    Years since quittin.7    Passive exposure: Never   Smokeless Tobacco Never     Family History   Problem Relation Age of Onset    Hypertension Mother     Thyroid disease Mother     Cancer Mother         Thyroid    Hypertension Father     Heart disease Father     Breast cancer Father 20    Colon cancer Maternal Grandfather        Meds/Allergies     Current Outpatient Medications:     atorvastatin (LIPITOR) 10 mg tablet, TAKE 1 TABLET BY MOUTH EVERY DAY, Disp: 90 tablet, Rfl: 6    cetirizine (ZyrTEC) 5 MG tablet, Take 10 mg by mouth daily, Disp: , Rfl:     losartan (COZAAR)  "25 mg tablet, TAKE 1 TABLET (25 MG TOTAL) BY MOUTH DAILY., Disp: 90 tablet, Rfl: 1    Multiple Vitamin (multivitamin) tablet, Take 1 tablet by mouth daily, Disp: , Rfl:   Allergies   Allergen Reactions    Penicillins Diarrhea and Rash       The following portions of the patient's history were reviewed and updated as appropriate: allergies, current medications, past family history, past medical history, past social history, past surgical history, and problem list.    Objective   Current Vitals:   Blood pressure 110/80, pulse 70, temperature 97.7 °F (36.5 °C), temperature source Temporal, height 5' 10\" (1.778 m), weight 134 kg (295 lb), SpO2 98%.    Physical Exam  Vitals reviewed.   Constitutional:       General: He is not in acute distress.     Appearance: He is obese. He is not ill-appearing or toxic-appearing.   HENT:      Head: Normocephalic.      Nose: No congestion.      Mouth/Throat:      Mouth: Mucous membranes are moist.   Eyes:      Pupils: Pupils are equal, round, and reactive to light.   Cardiovascular:      Rate and Rhythm: Normal rate.   Pulmonary:      Effort: Pulmonary effort is normal.   Abdominal:      Palpations: Abdomen is soft.   Musculoskeletal:         General: Normal range of motion.      Cervical back: Normal range of motion.   Skin:     General: Skin is warm.      Capillary Refill: Capillary refill takes less than 2 seconds.      Findings: Lesion present. No bruising or erythema.   Neurological:      General: No focal deficit present.      Mental Status: He is alert.   Psychiatric:         Mood and Affect: Mood normal.       The patient has no palpable cervical, supraclavicular, or axillary lymphadenopathy bilaterally.  The patient has bilateral gynecomastia. He has a palpable tender mass in the right 10 oclock mass, somewhat firm, and mobile, no overlying skin changes, not fixed. There are otherwise no dominant lumps, masses, skin changes or nipple retraction bilaterally.     I have spent a " total time of 55 minutes in caring for this patient on the day of the visit/encounter including Diagnostic results, Prognosis, Risks and benefits of tx options, Instructions for management, Patient and family education, Impressions, Counseling / Coordination of care, Reviewing / ordering tests, medicine, procedures  , Obtaining or reviewing history  , and Communicating with other healthcare professionals .   Signature:  Lorena Baxter MD  Date: 2/1/2025 Time: 8:14 PM

## 2025-02-03 ENCOUNTER — CONSULT (OUTPATIENT)
Dept: PLASTIC SURGERY | Facility: CLINIC | Age: 50
End: 2025-02-03
Payer: COMMERCIAL

## 2025-02-03 VITALS
HEIGHT: 70 IN | WEIGHT: 295 LBS | BODY MASS INDEX: 42.23 KG/M2 | DIASTOLIC BLOOD PRESSURE: 82 MMHG | SYSTOLIC BLOOD PRESSURE: 126 MMHG

## 2025-02-03 DIAGNOSIS — Z80.0 FAMILY HISTORY OF COLON CANCER: ICD-10-CM

## 2025-02-03 DIAGNOSIS — N64.1 FAT NECROSIS (SEGMENTAL) OF BREAST: ICD-10-CM

## 2025-02-03 DIAGNOSIS — N62 GYNECOMASTIA, MALE: ICD-10-CM

## 2025-02-03 DIAGNOSIS — Z80.8 FAMILY HISTORY OF THYROID CANCER: ICD-10-CM

## 2025-02-03 DIAGNOSIS — Z80.3 FAMILY HISTORY OF BREAST CANCER IN MALE: ICD-10-CM

## 2025-02-03 DIAGNOSIS — N64.4 MASTODYNIA OF RIGHT BREAST: ICD-10-CM

## 2025-02-03 DIAGNOSIS — Z12.83 SKIN CANCER SCREENING: ICD-10-CM

## 2025-02-03 PROCEDURE — 99204 OFFICE O/P NEW MOD 45 MIN: CPT | Performed by: PLASTIC SURGERY

## 2025-02-11 ENCOUNTER — TELEPHONE (OUTPATIENT)
Age: 50
End: 2025-02-11

## 2025-02-11 NOTE — TELEPHONE ENCOUNTER
LVM for pt regarding appt on 03/05 with Dr Barker in our [place] office. Informed pt that Dr Barker will be in surgery this day and we need to reschedule this appt. Pt has been rescheduled to new appt on 03/11 with Dr Barker in [place] at 1130. Advised pt to callback to r/s or cancel this appt if needed.

## 2025-02-25 ENCOUNTER — DOCUMENTATION (OUTPATIENT)
Dept: GENETICS | Facility: CLINIC | Age: 50
End: 2025-02-25

## 2025-02-28 ENCOUNTER — OFFICE VISIT (OUTPATIENT)
Dept: GENETICS | Facility: CLINIC | Age: 50
End: 2025-02-28

## 2025-02-28 DIAGNOSIS — N64.4 MASTODYNIA OF RIGHT BREAST: ICD-10-CM

## 2025-02-28 DIAGNOSIS — N62 GYNECOMASTIA, MALE: ICD-10-CM

## 2025-02-28 DIAGNOSIS — Z80.8 FAMILY HISTORY OF THYROID CANCER: ICD-10-CM

## 2025-02-28 DIAGNOSIS — Z80.3 FAMILY HISTORY OF BREAST CANCER IN MALE: ICD-10-CM

## 2025-02-28 DIAGNOSIS — Z80.0 FAMILY HISTORY OF COLON CANCER: ICD-10-CM

## 2025-02-28 DIAGNOSIS — N64.1 FAT NECROSIS (SEGMENTAL) OF BREAST: ICD-10-CM

## 2025-02-28 DIAGNOSIS — Z12.83 SKIN CANCER SCREENING: ICD-10-CM

## 2025-02-28 NOTE — PROGRESS NOTES
Pre-Test Genetic Counseling Consult Note    Patient Name: Dixon Schaeffer   /Age: 1975/49 y.o.  Referring Provider:  Lorena Baxter MD    Date of Service: 2025  Genetic Counselor: Johanna De Los Santos MS, [facility]  Interpretation Services: None  Location: Telephone consult   Length of Visit: 30 Minutes    Dixon was referred to the [facility] Cancer Risk and Genetic Assessment Program due to his family history of cancer . he presents today to discuss the possibility of a hereditary cancer syndrome, options for genetic testing, and implications for him and his family.     Cancer History and Treatment:   Personal History:   Oncology History    No history exists.     Screening Hx:   Breast:  Breast Imaging:     US breast right limited (diagnostic) and Mammo diagnostic bilateral w 3d and cad 24    IMPRESSION:  Indeterminate mass at 10:00 2 cm from the nipple within the right breast, corresponding to the palpable area of concern.  Ultrasound-guided biopsy is recommended for further evaluation.  I personally discussed these findings and recommendations with the patient.     Bilateral gynecomastia, right greater than left.    Breast Biopsy:     US guided breast biopsy right complete and Mammo post biopsy right 25    PATHOLOGY RESULTS:   1) Mass [A] (Right; Upper Outer; 10 o'clock; 2 cm from nipple)  The pathology results indicate a Benign finding with fibroadipose tissue and fat necrosis.   Radiology and pathology are concordant.     Breast Density: Almost entirely fatty    Colon:  Colonoscopy: 10/15/2020        Skin:  No current screening    Dermatology appointment scheduled 25    Prostate:  Prostate screening:   Component  Ref Range & Units (hover) 23  7:49 AM   Prostate Specific Antigen Total 2.9     Other screening: None    Medical and Surgical History  Pertinent surgical history:   Past Surgical History:   Procedure Laterality Date    US GUIDED BREAST BIOPSY RIGHT COMPLETE Right 2025     "  Pertinent medical history:  Past Medical History:   Diagnosis Date    Allergic     Seasonal    Heart murmur        Other History:  Height:   Ht Readings from Last 1 Encounters:   25 5' 10\" (1.778 m)     Weight:   Wt Readings from Last 1 Encounters:   25 134 kg (295 lb)       Relevant Family History   Patient reports no Ashkenazi Jain ancestry.     Maternal Family History:  Mother thyroid cancer at age 40 (currently age 76)  Uncle with prostate cancer at age 67 (currently age 69)  Uncle with unspecified skin cancer at age 76 (currently age 77)  Grandfather with colorectal cancer at age 78 ( age 80)  Great-aunt with colorectal cancer at age 78 ( age 78)    Paternal Family History:  Father with breast cancer at age 25 (currently age 78)    Please refer to the scanned pedigree in the Media Tab for a complete family history     *All history is reported as provided by the patient; records are not available for review, except where indicated.     Assessment:  We discussed sporadic, familial and hereditary cancer. We reviewed that only 5-10% of cancers are considered hereditary. We also discussed the many factors that influence our risk for cancer such as age, environmental exposures, lifestyle choices and family history.     We reviewed the indications suggestive of a hereditary predisposition to cancer such as a family history of a first degree relative with male breast cancer.    Of note, while testing an affected family member is most informative, it is also appropriate to test unaffected family members who meet testing criteria (NCCN Guidelines for Genetic/Familial High-Risk Assessment: Breast, Ovarian, and Pancreatic).      Genetic testing is indicated for Dixon based on the following criteria: Meets NCCN V2.2025 Testing Criteria for High-Penetrance Breast Cancer Susceptibility Genes: Patient has a family history of male breast cancer in a first-degree blood relative (father)     The " risks, benefits, and limitations of genetic testing were reviewed with the patient, as well as genetic discrimination laws, and possible test results (positive, negative, variants of uncertain significance) and their clinical implications. If positive for a mutation, options for managing cancer risk including increased surveillance, chemoprevention, and in some cases prophylactic surgery were discussed. Dixon was informed that if a hereditary cancer syndrome was identified in him, first degree relatives (parents, siblings, and children) have a chance of also inheriting the condition. Genetic testing would allow for predictive genetic testing in other relatives, who may also be at risk depending on their degree of relation.     Billing:  Most individuals pay <$100 for hereditary cancer genetic testing. If insurance covers the cost of the testing, individuals may still pay out of pocket secondary to co-pays, co-insurance, or deductibles. If the cost of the testing exceeds $100, the lab will reach out to the patient via phone or e-mail. The patient will then have the option to proceed with the testing, cancel the testing, or elect the self-pay option of $250. Dixon verbalized understanding.     Plan: Patient decided to proceed with testing and provided consent.    Summary:     Sample Collection:  An order was placed and the patient was instructed to go to a [facility] lab for a blood collection    Genetic Testing Preformed: CustomNext: Cancer® +RNAinsight® (59 genes): APC, KATI, AXIN2, BAP1, BARD1, BMPR1A, BRCA1, BRCA2, BRIP1, CDH1, CDK4, CDKN1B, CDKN2A, CHEK2, CTNNA1, DICER1, EGLN1, EPCAM, FH, FLCN, GREM1, HOXB13, KIF1B, KIT, MAX, MEN1, MET, MITF, MLH1, MSH2, MSH3, MSH6, MUTYH, NF1, NTHL1, PALB2, PDGFRA PMS2, POLD1, POLE, POT1, PTEN, RAD51C, RAD51D, RB1, RET, SDHA, SDHAF2, SDHB, SDHC, SDHD, SMAD4, SMARCA4, STK11, PVHC675, TP53, TSC1, TSC2, VHL     Result Call Information:  In the event that we need to reach Dixon via  telephone:  I confirmed the patient's mobile number on file as the best number to call with results  I confirmed with the patient that we can leave a voicemail on the provided numbers    Results take approximately 2-3 weeks to complete once test is started.    Dixon will be notified via Scriptedt once results are available.      Additional recommendations for surveillance/medical management will be made pending genetic test results.

## 2025-03-03 ENCOUNTER — APPOINTMENT (OUTPATIENT)
Dept: LAB | Facility: CLINIC | Age: 50
End: 2025-03-03
Payer: COMMERCIAL

## 2025-03-03 DIAGNOSIS — N62 GYNECOMASTIA, MALE: ICD-10-CM

## 2025-03-03 DIAGNOSIS — N64.1 FAT NECROSIS (SEGMENTAL) OF BREAST: ICD-10-CM

## 2025-03-03 DIAGNOSIS — Z80.3 FAMILY HISTORY OF BREAST CANCER IN MALE: ICD-10-CM

## 2025-03-03 DIAGNOSIS — N64.4 MASTODYNIA OF RIGHT BREAST: ICD-10-CM

## 2025-03-03 PROBLEM — Z12.83 SKIN CANCER SCREENING: Status: RESOLVED | Noted: 2025-02-01 | Resolved: 2025-03-03

## 2025-03-03 PROCEDURE — 36415 COLL VENOUS BLD VENIPUNCTURE: CPT

## 2025-03-16 LAB
GENE DIS ANL INTERP-IMP: NORMAL
INTERPRETATION: NORMAL

## 2025-03-17 ENCOUNTER — RESULTS FOLLOW-UP (OUTPATIENT)
Dept: GENETICS | Facility: CLINIC | Age: 50
End: 2025-03-17

## 2025-03-22 NOTE — PROGRESS NOTES
[facility]   Plastic and Reconstructive Surgery   [address] [place] [id number]     HISTORY & PHYSICAL      Assessment & Plan  Gynecomastia, male  Dixon Schaeffer is a 49-year-old male with a history of male breast cancer in his father who was recently seen by general surgery for 6 months of new onset right breast pain accompanied by 1 month of a palpable right central breast mass associated with the pain.  This mass was biopsy-proven fat necrosis.  Patient has genetic testing pending at this moment.  Patient has a history of bilateral breast enlargement since childhood with a noticeable progression in size with the addition of bilateral nipple sensitivity over the last 2 years in the setting of 100 pounds weight gain.  On exam patient with moderate enlargement of the bilateral chest comprised majority of fatty tissue.  Patient also with moderate ptosis. Patient would like to discuss reduction of his bilateral enlarged breasts.    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  We also discussed that I think the most appropriate first step would be the lifestyle modifications in the form of diet and exercise.  A reduction in weight and BMI could significantly impact the appearance of his bilateral chest.  Additionally,  a reduction in weight, BMI and an improvement in his overall health will significantly reduce the risk of surgical complications.    I discussed the nature of surgery to address the enlargement of his bilateral breast.  We discussed that this would likely be a combination of liposuction and a direct excision of both skin and underlying fatty tissue.  Discussed with patient that given the size of his bilateral breasts and the degree of ptosis he may require a double incision and free nipple grafting to address his bilateral breast enlargement if we were  to do surgery at his current state.  Discussed with the patient that he will have at least 2 small liposuction incisions at the lateral edge of each chest.  He will also have either a periareolar incision or transverse incision across his chest.  Discussed the risks associated with this procedure which include but are not limited to bleeding, infection, skin death, nipple death, contour deformity, skin excess, open wound, scarring, asymmetry, need for additional procedures.    Patient expressed understanding of the nature of the surgery as well as the associated risks.        I have spent a total time of 45 minutes in caring for this patient on the day of the visit/encounter including Prognosis, Risks and benefits of tx options, Patient and family education, Documenting in the medical record, and Obtaining or reviewing history  .      History of Present Illness   Dixon Schaeffer is a 49 y.o. male with a family history of male breast cancer in his father, a past medical history significant for 6 months of new onset right breast pain accompanied by a painful right breast mass for 1 month demonstrated to be fat necrosis on biopsy.  Patient presents today to  learn about options for management of his bilateral breast enlargement.  Patient notes that he has had enlarged breast bilaterally since childhood but notes that this has become significantly more prominent over the last 2 years and has now been accompanied by nipple sensation in the last year.  Patient does note that he has gained approximately 100 pounds over the last 2 years.  Patient denies any overlying skin changes, or nipple changes.  Patient notes no other pain or masses in his breasts.  Patient denies fevers, chills, loss of appetite, unintentional weight loss, new aches or pains outside of his right breast.  Patient has not had any surgeries on his chest other than the most recent right breast biopsy.      Review of Systems   Constitutional:  Negative for  activity change, appetite change, chills, fatigue and fever.   HENT: Negative.     Respiratory:  Negative for chest tightness and shortness of breath.    Cardiovascular:  Positive for chest pain. Negative for leg swelling.   Gastrointestinal:  Negative for abdominal pain.   Musculoskeletal: Negative.    Neurological: Negative.    Psychiatric/Behavioral: Negative.         Past Medical History:   Diagnosis Date    Allergic     Seasonal    Heart murmur         Past Surgical History:   Procedure Laterality Date    US GUIDED BREAST BIOPSY RIGHT COMPLETE Right 2025       Current Outpatient Medications on File Prior to Visit   Medication Sig Dispense Refill    atorvastatin (LIPITOR) 10 mg tablet TAKE 1 TABLET BY MOUTH EVERY DAY 90 tablet 6    cetirizine (ZyrTEC) 5 MG tablet Take 10 mg by mouth daily      losartan (COZAAR) 25 mg tablet TAKE 1 TABLET (25 MG TOTAL) BY MOUTH DAILY. 90 tablet 1    Multiple Vitamin (multivitamin) tablet Take 1 tablet by mouth daily       No current facility-administered medications on file prior to visit.       Allergies   Allergen Reactions    Penicillins Diarrhea and Rash       Social History     Socioeconomic History    Marital status: /Civil Union     Spouse name: Not on file    Number of children: Not on file    Years of education: Not on file    Highest education level: Not on file   Occupational History    Not on file   Tobacco Use    Smoking status: Former     Current packs/day: 0.00     Average packs/day: 0.5 packs/day for 10.4 years (5.2 ttl pk-yrs)     Types: Cigarettes     Start date: 2003     Quit date: 2013     Years since quittin.8     Passive exposure: Never    Smokeless tobacco: Never   Vaping Use    Vaping status: Never Used   Substance and Sexual Activity    Alcohol use: Yes     Alcohol/week: 2.0 standard drinks of alcohol     Types: 2 Cans of beer per week     Comment: Per week    Drug use: Never    Sexual activity: Yes     Partners: Female     Birth  control/protection: Diaphragm, Male Sterilization   Other Topics Concern    Not on file   Social History Narrative    Not on file     Social Drivers of Health     Financial Resource Strain: Not on file   Food Insecurity: Not on file   Transportation Needs: Not on file   Physical Activity: Not on file   Stress: Not on file   Social Connections: Not on file   Intimate Partner Violence: Not At Risk (11/3/2023)    Humiliation, Afraid, Rape, and Kick questionnaire     Fear of Current or Ex-Partner: No     Emotionally Abused: No     Physically Abused: No     Sexually Abused: No   Housing Stability: Not on file           Physical Exam  Constitutional:       General: He is not in acute distress.     Appearance: Normal appearance. He is obese.   HENT:      Head: Normocephalic and atraumatic.   Eyes:      Extraocular Movements: Extraocular movements intact.      Conjunctiva/sclera: Conjunctivae normal.   Cardiovascular:      Rate and Rhythm: Normal rate.      Pulses: Normal pulses.   Pulmonary:      Effort: Pulmonary effort is normal.   Chest:      Comments: Significant enlargement of bilateral breasts.  Relatively symmetric.  Grade II ptosis bilaterally.  Bilateral breasts are soft without palpable firm tissue.  Palpable mass at biopsy site.  No nipple changes.   Abdominal:      Palpations: Abdomen is soft.      Tenderness: There is no abdominal tenderness.      Comments: Obese body habitus.  Significant excess adipose and skin of the upper and lower abdomen.   Neurological:      Mental Status: He is alert.        Body mass index is 42.33 kg/m².

## 2025-03-22 NOTE — ASSESSMENT & PLAN NOTE
Dixon Schaeffer is a 49-year-old male with a history of male breast cancer in his father who was recently seen by general surgery for 6 months of new onset right breast pain accompanied by 1 month of a palpable right central breast mass associated with the pain.  This mass was biopsy-proven fat necrosis.  Patient has genetic testing pending at this moment.  Patient has a history of bilateral breast enlargement since childhood with a noticeable progression in size with the addition of bilateral nipple sensitivity over the last 2 years in the setting of 100 pounds weight gain.  On exam patient with moderate enlargement of the bilateral chest comprised majority of fatty tissue.  Patient also with moderate ptosis. Patient would like to discuss reduction of his bilateral enlarged breasts.    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  We also discussed that I think the most appropriate first step would be the lifestyle modifications in the form of diet and exercise.  A reduction in weight and BMI could significantly impact the appearance of his bilateral chest.  Additionally,  a reduction in weight, BMI and an improvement in his overall health will significantly reduce the risk of surgical complications.    I discussed the nature of surgery to address the enlargement of his bilateral breast.  We discussed that this would likely be a combination of liposuction and a direct excision of both skin and underlying fatty tissue.  Discussed with patient that given the size of his bilateral breasts and the degree of ptosis he may require a double incision and free nipple grafting to address his bilateral breast enlargement if we were to do surgery at his current state.  Discussed with the patient that he will have at least 2 small liposuction incisions at the lateral edge of each chest.   He will also have either a periareolar incision or transverse incision across his chest.  Discussed the risks associated with this procedure which include but are not limited to bleeding, infection, skin death, nipple death, contour deformity, skin excess, open wound, scarring, asymmetry, need for additional procedures.    Patient expressed understanding of the nature of the surgery as well as the associated risks.

## 2025-03-31 ENCOUNTER — TELEPHONE (OUTPATIENT)
Dept: SURGERY | Facility: CLINIC | Age: 50
End: 2025-03-31

## 2025-03-31 NOTE — TELEPHONE ENCOUNTER
Please check in with the patient. Glad to see negative genetic testing and that his visit with Dr. Barker from plastic surgery went well. Please ask him to update us on his plans. Would he like to arrange a 6 month [place] follow up and visit with us? Is he considering surgical management bilaterally with Dr. Barker? Thanks.

## 2025-04-01 NOTE — TELEPHONE ENCOUNTER
Called patient, he will let us know if he decided on either. He at this time is not going surgically with Dr. Barker and doesn't want to arrange follow up in 6 months with our office / imaging.

## 2025-06-12 DIAGNOSIS — I10 PRIMARY HYPERTENSION: ICD-10-CM

## 2025-06-12 RX ORDER — LOSARTAN POTASSIUM 25 MG/1
25 TABLET ORAL DAILY
Qty: 90 TABLET | Refills: 1 | Status: SHIPPED | OUTPATIENT
Start: 2025-06-12

## 2025-06-13 DIAGNOSIS — R93.1 AGATSTON CORONARY ARTERY CALCIUM SCORE LESS THAN 100: ICD-10-CM

## 2025-06-13 NOTE — TELEPHONE ENCOUNTER
Requested medication(s) are due for refill today: Yes  Patient has already received a courtesy refill: No  Other reason request has been forwarded to provider: Per refill pod, provider needs to approve Rx refill

## 2025-06-16 RX ORDER — ATORVASTATIN CALCIUM 10 MG/1
10 TABLET, FILM COATED ORAL DAILY
Qty: 90 TABLET | Refills: 3 | Status: SHIPPED | OUTPATIENT
Start: 2025-06-16

## (undated) DEVICE — GOWN SURG X-LARGE MICROCOOL

## (undated) DEVICE — GOWN SURG LARGE MICROCOOL

## (undated) DEVICE — GLOVE SZ 8 PROTEXIS CLASSIC LATEX

## (undated) DEVICE — CLEANSTART BEDSIDE KIT 500ML